# Patient Record
Sex: FEMALE | Race: WHITE | NOT HISPANIC OR LATINO | Employment: OTHER | URBAN - METROPOLITAN AREA
[De-identification: names, ages, dates, MRNs, and addresses within clinical notes are randomized per-mention and may not be internally consistent; named-entity substitution may affect disease eponyms.]

---

## 2017-01-10 ENCOUNTER — PROCEDURE VISIT (OUTPATIENT)
Dept: OBSTETRICS AND GYNECOLOGY | Facility: CLINIC | Age: 62
End: 2017-01-10

## 2017-01-10 DIAGNOSIS — Z78.0 POSTMENOPAUSAL: ICD-10-CM

## 2017-01-10 DIAGNOSIS — Z13.820 SCREENING FOR OSTEOPOROSIS: Primary | ICD-10-CM

## 2017-01-10 DIAGNOSIS — M85.80 OSTEOPENIA: ICD-10-CM

## 2017-01-10 PROCEDURE — 77080 DXA BONE DENSITY AXIAL: CPT | Performed by: NURSE PRACTITIONER

## 2017-01-10 NOTE — MR AVS SNAPSHOT
Pebbles Khan   1/10/2017 8:30 AM   Appointment    Dept Phone:  [phone]   Encounter #:  [id number]    Provider:  YORDY ORTEGA   Department:  [facility]                Your Full Care Plan              Your Updated Medication List      Notice  As of 1/10/2017  8:33 AM    You have not been prescribed any medications.            Instructions     None    Patient Instructions History      Upcoming Appointments     Visit Type Date Time Department    BONE DENSITY 1/10/2017  8:30 AM [facility] [place]    WELLNESS 2017  9:00 AM [facility] [place]      [employer] Signup     [employer] allows you to send messages to your doctor, view your test results, renew your prescriptions, schedule appointments, and more. To sign up, go to [employer] and click on the Sign Up Now link in the New User? box. Enter your World[employer] Activation Code exactly as it appears below along with the last four digits of your Social Security Number and your Date of Birth () to complete the sign-up process. If you do not sign up before the expiration date, you must request a new code.    WorldTV Activation Code: [email or web address]  Expires: 2017  8:33 AM    If you have questions, you can email [email or web address] or call [phone] to talk to our WorldTV staff. Remember, [employer] is NOT to be used for urgent needs. For medical emergencies, dial 911.               Other Info from Your Visit           Your Appointments     2017  9:00 AM EST   Wellness with [facility]   [facility] (--)    [address] [place] [address]   [phone]              Allergies     Not on File

## 2017-01-19 ENCOUNTER — OFFICE VISIT (OUTPATIENT)
Dept: OBSTETRICS AND GYNECOLOGY | Facility: CLINIC | Age: 62
End: 2017-01-19

## 2017-01-19 VITALS
WEIGHT: 153.6 LBS | SYSTOLIC BLOOD PRESSURE: 124 MMHG | BODY MASS INDEX: 28.26 KG/M2 | DIASTOLIC BLOOD PRESSURE: 68 MMHG | HEIGHT: 62 IN

## 2017-01-19 DIAGNOSIS — Z01.419 GYNECOLOGIC EXAM NORMAL: Primary | ICD-10-CM

## 2017-01-19 DIAGNOSIS — M81.0 OSTEOPOROSIS: ICD-10-CM

## 2017-01-19 PROCEDURE — 99396 PREV VISIT EST AGE 40-64: CPT | Performed by: NURSE PRACTITIONER

## 2017-01-19 RX ORDER — SUMATRIPTAN 100 MG/1
TABLET, FILM COATED ORAL
COMMUNITY
Start: 2013-04-26 | End: 2017-12-04 | Stop reason: SDUPTHER

## 2017-01-19 RX ORDER — SIMVASTATIN 40 MG
TABLET ORAL
COMMUNITY
Start: 2013-04-26 | End: 2017-09-06 | Stop reason: SDUPTHER

## 2017-01-19 RX ORDER — LANSOPRAZOLE 15 MG/1
15 CAPSULE, DELAYED RELEASE ORAL DAILY
COMMUNITY
End: 2017-04-17

## 2017-01-19 RX ORDER — UBIDECARENONE 30 MG
CAPSULE ORAL
COMMUNITY
Start: 2013-05-01 | End: 2018-06-04

## 2017-01-19 NOTE — MR AVS SNAPSHOT
Pebbles Khan   2017 1:00 PM   Office Visit    Dept Phone:  [phone]   Encounter #:  [id number]    Provider:  ROBERT Plaza   Department:  [facility]                Your Full Care Plan              Your Updated Medication List          This list is accurate as of: 17  1:56 PM.  Always use your most recent med list.                ADVANCED CALCIUM/D/MAGNESIUM tablet       Fish Oil-Vitamin D 6305-1914 MG-UNIT capsule       lansoprazole 15 MG capsule   Commonly known as:  PREVACID       simvastatin 40 MG tablet   Commonly known as:  ZOCOR       SUMAtriptan 100 MG tablet   Commonly known as:  IMITREX               We Performed the Following     Comprehensive Metabolic Panel     Pap IG, Rfx HPV ASCU     Vitamin D 25 Hydroxy       You Were Diagnosed With        Codes Comments    Gynecologic exam normal    -  Primary ICD-10-CM: Z01.419  ICD-9-CM: V72.31     Osteoporosis     ICD-10-CM: M81.0  ICD-9-CM: 733.00       Medications to be Given to You by a Medical Professional     Due       Frequency    2017 denosumab (PROLIA) syringe 60 mg  Once      Instructions    Reviewed dxa with pt and developed a POC. Gave her brochure on prolia. Discussed risks and benefits     Patient Instructions History      Upcoming Appointments     Visit Type Date Time Department    WELLNESS 2017  1:00 PM [facility]      Startups Signup     [facility] Startups allows you to send messages to your doctor, view your test results, renew your prescriptions, schedule appointments, and more. To sign up, go to [employer] and click on the Sign Up Now link in the New User? box. Enter your Startups Activation Code exactly as it appears below along with the last four digits of your Social Security Number and your Date of Birth () to complete the sign-up process. If you do not sign up before the expiration date, you must request a new code.    Startups  "Activation Code: [email or web address]  Expires: 1/24/2017  8:33 AM    If you have questions, you can email [email or web address] or call [phone] to talk to our MyChart staff. Remember, [employer] is NOT to be used for urgent needs. For medical emergencies, dial 911.               Other Info from Your Visit           Allergies     No Known Allergies      Reason for Visit     Gynecologic Exam annual      Vital Signs     Blood Pressure Height Weight Breastfeeding? Body Mass Index Smoking Status    124/68 62\" (157.5 cm) 153 lb 9.6 oz (69.7 kg) No 28.09 kg/m2 Never Smoker      Problems and Diagnoses Noted     Normal pelvic exam    -  Primary    Osteoporosis            "

## 2017-01-19 NOTE — PROGRESS NOTES
Page B Bill is a 61 y.o. female.   Chief Complaint   Patient presents with   • Gynecologic Exam     annual      HPI:pt here fo annual exam, no c/o since last visit    The following portions of the patient's history were reviewed and updated as appropriate: allergies, current medications, past family history, past medical history, past social history, past surgical history and problem list.    Review of Systems  Review of Systems   Constitutional: Negative.  Negative for unexpected weight change.   Respiratory: Negative for chest tightness and shortness of breath.    Cardiovascular: Negative for chest pain and palpitations.   Gastrointestinal: Negative for abdominal pain and blood in stool.   Endocrine: Negative.    Genitourinary: Negative for dyspareunia, dysuria, frequency, hematuria, menstrual problem, pelvic pain, vaginal bleeding, vaginal discharge and vaginal pain.   Musculoskeletal: Negative for joint swelling.   Skin: Negative for color change, rash and wound.   Allergic/Immunologic: Negative.    Psychiatric/Behavioral: Negative.    All other systems reviewed and are negative.      Objective   Physical Exam   Constitutional: She is oriented to person, place, and time. She appears well-developed and well-nourished.   HENT:   Head: Normocephalic.   Neck: Normal range of motion.   Cardiovascular: Normal rate and regular rhythm.    Pulmonary/Chest: Effort normal and breath sounds normal. Right breast exhibits no mass and no nipple discharge. Left breast exhibits no mass and no nipple discharge. There is no breast swelling.   Breasts soft without palpable masses   Abdominal: Soft. Bowel sounds are normal.   Genitourinary: Vagina normal and uterus normal. There is no rash or lesion on the right labia. There is no rash or lesion on the left labia. Cervix exhibits no friability. Right adnexum displays no mass, no tenderness and no fullness. Left adnexum displays no mass, no tenderness and no fullness.    Genitourinary Comments: Ovaries  Within normal limits. Cervix  Within normal limits   Neurological: She is alert and oriented to person, place, and time.   Skin: Skin is warm and dry.   Psychiatric: She has a normal mood and affect. Her behavior is normal.   Vitals reviewed.      Assessment/Plan   Patient Instructions   Reviewed dxa with pt and developed a POC. Gave her brochure on prolia. Discussed risks and benefits      Page was seen today for gynecologic exam.    Diagnoses and all orders for this visit:    Gynecologic exam normal  -     Pap IG, Rfx HPV ASCU    Osteoporosis  -     Vitamin D 25 Hydroxy  -     Comprehensive Metabolic Panel  -     denosumab (PROLIA) syringe 60 mg; Inject 1 mL under the skin 1 (One) Time.        Return in about 4 weeks (around 2/16/2017).

## 2017-01-19 NOTE — PATIENT INSTRUCTIONS
Reviewed dxa with pt and developed a POC. Gave her brochure on prolia. Discussed risks and benefits

## 2017-01-20 LAB
25(OH)D3+25(OH)D2 SERPL-MCNC: 49 NG/ML (ref 30–100)
ALBUMIN SERPL-MCNC: 4.8 G/DL (ref 3.5–5.2)
ALBUMIN/GLOB SERPL: 2.1 G/DL
ALP SERPL-CCNC: 62 U/L (ref 39–117)
ALT SERPL-CCNC: 23 U/L (ref 1–33)
AST SERPL-CCNC: 24 U/L (ref 1–32)
BILIRUB SERPL-MCNC: 0.4 MG/DL (ref 0.1–1.2)
BUN SERPL-MCNC: 15 MG/DL (ref 8–23)
BUN/CREAT SERPL: 11.5 (ref 7–25)
CALCIUM SERPL-MCNC: 9.7 MG/DL (ref 8.6–10.5)
CHLORIDE SERPL-SCNC: 99 MMOL/L (ref 98–107)
CO2 SERPL-SCNC: 28.8 MMOL/L (ref 22–29)
CREAT SERPL-MCNC: 1.31 MG/DL (ref 0.57–1)
GLOBULIN SER CALC-MCNC: 2.3 GM/DL
GLUCOSE SERPL-MCNC: 95 MG/DL (ref 65–99)
POTASSIUM SERPL-SCNC: 4.6 MMOL/L (ref 3.5–5.2)
PROT SERPL-MCNC: 7.1 G/DL (ref 6–8.5)
SODIUM SERPL-SCNC: 140 MMOL/L (ref 136–145)

## 2017-01-23 LAB
CONV .: NORMAL
CYTOLOGIST CVX/VAG CYTO: NORMAL
CYTOLOGY CVX/VAG DOC THIN PREP: NORMAL
DX ICD CODE: NORMAL
HIV 1 & 2 AB SER-IMP: NORMAL
OTHER STN SPEC: NORMAL
PATH REPORT.FINAL DX SPEC: NORMAL
STAT OF ADQ CVX/VAG CYTO-IMP: NORMAL

## 2017-04-17 ENCOUNTER — OFFICE VISIT (OUTPATIENT)
Dept: INTERNAL MEDICINE | Facility: CLINIC | Age: 62
End: 2017-04-17

## 2017-04-17 VITALS
TEMPERATURE: 98.5 F | BODY MASS INDEX: 25.69 KG/M2 | HEART RATE: 80 BPM | SYSTOLIC BLOOD PRESSURE: 128 MMHG | WEIGHT: 145 LBS | HEIGHT: 63 IN | DIASTOLIC BLOOD PRESSURE: 82 MMHG | RESPIRATION RATE: 16 BRPM

## 2017-04-17 DIAGNOSIS — E78.2 MIXED HYPERLIPIDEMIA: Primary | ICD-10-CM

## 2017-04-17 DIAGNOSIS — K44.9 HIATAL HERNIA: ICD-10-CM

## 2017-04-17 DIAGNOSIS — G43.001 MIGRAINE WITHOUT AURA AND WITH STATUS MIGRAINOSUS, NOT INTRACTABLE: ICD-10-CM

## 2017-04-17 DIAGNOSIS — M85.80 OSTEOPENIA: ICD-10-CM

## 2017-04-17 PROBLEM — E78.5 HYPERLIPIDEMIA: Status: ACTIVE | Noted: 2017-04-17

## 2017-04-17 PROBLEM — G43.909 MIGRAINE: Status: ACTIVE | Noted: 2017-04-17

## 2017-04-17 PROBLEM — K21.00 GASTROESOPHAGEAL REFLUX DISEASE WITH ESOPHAGITIS: Status: ACTIVE | Noted: 2017-04-17

## 2017-04-17 PROCEDURE — 99214 OFFICE O/P EST MOD 30 MIN: CPT | Performed by: FAMILY MEDICINE

## 2017-04-17 RX ORDER — CHOLECALCIFEROL (VITAMIN D3) 125 MCG
5 CAPSULE ORAL
COMMUNITY

## 2017-04-17 RX ORDER — CHOLECALCIFEROL (VITAMIN D3) 125 MCG
CAPSULE ORAL
COMMUNITY
End: 2018-06-04

## 2017-04-17 RX ORDER — PHENOL 1.4 %
1200 AEROSOL, SPRAY (ML) MUCOUS MEMBRANE DAILY
COMMUNITY
End: 2018-06-04

## 2017-04-17 NOTE — PROGRESS NOTES
Subjective   Page ABRAM Khan is a 61 y.o. female.     Chief Complaint   Patient presents with   • Hypertension   • Hyperlipidemia   • osteopenia         History of Present Illness page is here to resume primary care.  Her previous physician is a different style practice.  Prior labs reviewed.  History includes hyperlipidemia and she is gotten off medication for reflux due to history of osteopenia with T score -2.3.  She does not wish to take medical treatment for osteopenia because of possible side effects.  She is on calcium and vitamin D and exercise.  He has a follow-up with her OB/GYN as well.  DEXA scan is reviewed as well as labs.    The following portions of the patient's history were reviewed and updated as appropriate: allergies, current medications, past social history and problem list.    Review of Systems   Constitutional: Negative.    HENT: Negative.    Eyes: Negative.    Respiratory: Negative.    Cardiovascular: Negative.    Gastrointestinal: Negative.    Endocrine: Negative.    Genitourinary: Negative.    Musculoskeletal: Negative.    Skin: Negative.    Allergic/Immunologic: Negative.    Neurological: Negative.    Hematological: Negative.    Psychiatric/Behavioral: Negative.        Objective   Vitals:    04/17/17 1248   BP: 128/82   Pulse: 80   Resp: 16   Temp: 98.5 °F (36.9 °C)     Physical Exam   Constitutional: She is oriented to person, place, and time. She appears well-developed and well-nourished.   HENT:   Head: Normocephalic and atraumatic.   Right Ear: Tympanic membrane and external ear normal.   Left Ear: Tympanic membrane and external ear normal.   Nose: Nose normal.   Mouth/Throat: Oropharynx is clear and moist.   Eyes: Conjunctivae and EOM are normal. Pupils are equal, round, and reactive to light.   Neck: Normal range of motion. Neck supple. No JVD present. No thyromegaly present.   Cardiovascular: Normal rate, regular rhythm, normal heart sounds and intact distal pulses.     Pulmonary/Chest: Effort normal and breath sounds normal.   Abdominal: Soft. Bowel sounds are normal.   Musculoskeletal: Normal range of motion.   Lymphadenopathy:     She has no cervical adenopathy.   Neurological: She is alert and oriented to person, place, and time. No cranial nerve deficit. Coordination normal.   Skin: Skin is warm and dry. No rash noted.   Psychiatric: She has a normal mood and affect. Her behavior is normal. Judgment and thought content normal.   Vitals reviewed.      Assessment/Plan   Problem List Items Addressed This Visit        Cardiovascular and Mediastinum    Hyperlipidemia - Primary    Migraine       Respiratory    Hiatal hernia      Other Visit Diagnoses     Osteopenia          Plan: Continue simvastatin 40 mg daily.  Over-the-counter hazel when necessary.  Vitamin D plus calcium.  Exercise.  We'll see her back here sooner if needed.  She'll repeat DEXA scan through GYN and will review that when she comes back in.

## 2017-08-17 ENCOUNTER — APPOINTMENT (OUTPATIENT)
Dept: WOMENS IMAGING | Facility: HOSPITAL | Age: 62
End: 2017-08-17

## 2017-08-17 PROCEDURE — 77067 SCR MAMMO BI INCL CAD: CPT | Performed by: RADIOLOGY

## 2017-08-17 PROCEDURE — G0202 SCR MAMMO BI INCL CAD: HCPCS | Performed by: RADIOLOGY

## 2017-08-17 PROCEDURE — 77063 BREAST TOMOSYNTHESIS BI: CPT | Performed by: RADIOLOGY

## 2017-09-06 RX ORDER — SIMVASTATIN 40 MG
TABLET ORAL
Qty: 30 TABLET | Refills: 0 | Status: SHIPPED | OUTPATIENT
Start: 2017-09-06 | End: 2017-10-05 | Stop reason: SDUPTHER

## 2017-10-05 RX ORDER — SIMVASTATIN 40 MG
TABLET ORAL
Qty: 30 TABLET | Refills: 0 | Status: SHIPPED | OUTPATIENT
Start: 2017-10-05 | End: 2017-10-30 | Stop reason: SDUPTHER

## 2017-10-30 RX ORDER — SIMVASTATIN 40 MG
TABLET ORAL
Qty: 90 TABLET | Refills: 1 | Status: SHIPPED | OUTPATIENT
Start: 2017-10-30 | End: 2018-04-19 | Stop reason: SDUPTHER

## 2017-12-04 RX ORDER — SUMATRIPTAN 100 MG/1
TABLET, FILM COATED ORAL
Qty: 9 TABLET | Refills: 5 | Status: SHIPPED | OUTPATIENT
Start: 2017-12-04 | End: 2018-10-22 | Stop reason: SDUPTHER

## 2018-04-19 RX ORDER — SIMVASTATIN 40 MG
TABLET ORAL
Qty: 90 TABLET | Refills: 0 | Status: SHIPPED | OUTPATIENT
Start: 2018-04-19 | End: 2018-07-15 | Stop reason: SDUPTHER

## 2018-06-04 ENCOUNTER — OFFICE VISIT (OUTPATIENT)
Dept: INTERNAL MEDICINE | Facility: CLINIC | Age: 63
End: 2018-06-04

## 2018-06-04 VITALS
WEIGHT: 154 LBS | OXYGEN SATURATION: 98 % | SYSTOLIC BLOOD PRESSURE: 118 MMHG | HEART RATE: 109 BPM | BODY MASS INDEX: 27.72 KG/M2 | DIASTOLIC BLOOD PRESSURE: 88 MMHG | TEMPERATURE: 98.5 F

## 2018-06-04 DIAGNOSIS — G89.29 CHRONIC MIDLINE LOW BACK PAIN WITHOUT SCIATICA: ICD-10-CM

## 2018-06-04 DIAGNOSIS — K44.9 HIATAL HERNIA: Primary | ICD-10-CM

## 2018-06-04 DIAGNOSIS — E78.2 MIXED HYPERLIPIDEMIA: ICD-10-CM

## 2018-06-04 DIAGNOSIS — K21.00 GASTROESOPHAGEAL REFLUX DISEASE WITH ESOPHAGITIS: ICD-10-CM

## 2018-06-04 DIAGNOSIS — G43.001 MIGRAINE WITHOUT AURA AND WITH STATUS MIGRAINOSUS, NOT INTRACTABLE: ICD-10-CM

## 2018-06-04 DIAGNOSIS — M54.50 CHRONIC MIDLINE LOW BACK PAIN WITHOUT SCIATICA: ICD-10-CM

## 2018-06-04 PROCEDURE — 99396 PREV VISIT EST AGE 40-64: CPT | Performed by: FAMILY MEDICINE

## 2018-06-04 PROCEDURE — 99213 OFFICE O/P EST LOW 20 MIN: CPT | Performed by: FAMILY MEDICINE

## 2018-06-04 RX ORDER — LANSOPRAZOLE 15 MG/1
15 CAPSULE, DELAYED RELEASE ORAL DAILY
COMMUNITY

## 2018-06-04 NOTE — PROGRESS NOTES
Subjective   Page B Bill is a 62 y.o. female.     Chief Complaint   Patient presents with   • Annual Exam         History of Present Illness   Delightful lady here for annual exam.  History includes green headache, fairly large hiatal hernia followed by gastroenterology, pending colonoscopy and upper endoscopy.  Also history of mixed hyperlipidemia.  Last year there was elevation of creatinine slightly 1.31 with the worry about proton pump inhibitor chronically.  We'll repeat labs this week.      The following portions of the patient's history were reviewed and updated as appropriate: allergies, current medications, past social history and problem list.    Review of Systems   Constitutional: Negative.    HENT: Negative.    Eyes: Negative.    Respiratory: Negative.    Cardiovascular: Negative.    Gastrointestinal: Negative.    Endocrine: Negative.    Genitourinary: Negative.    Musculoskeletal: Positive for back pain.   Allergic/Immunologic: Negative.    Neurological: Negative.    Hematological: Negative.    Psychiatric/Behavioral: Negative.        Objective   Vitals:    06/04/18 1550   BP: 118/88   Pulse: 109   Temp: 98.5 °F (36.9 °C)   SpO2: 98%     Physical Exam   Constitutional: She is oriented to person, place, and time. She appears well-developed.   HENT:   Head: Normocephalic.   Right Ear: Tympanic membrane and external ear normal.   Left Ear: Tympanic membrane and external ear normal.   Mouth/Throat: Oropharynx is clear and moist.   Eyes: Pupils are equal, round, and reactive to light.   Neck: Normal range of motion. Neck supple.   Cardiovascular: Normal rate, regular rhythm and normal heart sounds.    Pulmonary/Chest: Effort normal and breath sounds normal.   Abdominal: Soft. Bowel sounds are normal.   Musculoskeletal: Normal range of motion.   Neurological: She is alert and oriented to person, place, and time.   Skin: Skin is warm and dry.   Psychiatric: She has a normal mood and affect.   Vitals  reviewed.      Assessment/Plan   Problem List Items Addressed This Visit        Cardiovascular and Mediastinum    Hyperlipidemia    Relevant Orders    CBC & Differential    Comprehensive Metabolic Panel    Lipid Panel With / Chol / HDL Ratio    TSH    Urinalysis With / Microscopic If Indicated (No Culture) - Urine, Clean Catch    T4, Free    Migraine    Relevant Orders    CBC & Differential    Comprehensive Metabolic Panel    Lipid Panel With / Chol / HDL Ratio    TSH    Urinalysis With / Microscopic If Indicated (No Culture) - Urine, Clean Catch    T4, Free       Respiratory    Hiatal hernia - Primary    Relevant Medications    lansoprazole (PREVACID) 15 MG capsule    Other Relevant Orders    CBC & Differential    Comprehensive Metabolic Panel    Lipid Panel With / Chol / HDL Ratio    TSH    Urinalysis With / Microscopic If Indicated (No Culture) - Urine, Clean Catch    T4, Free       Digestive    Gastroesophageal reflux disease with esophagitis    Relevant Medications    lansoprazole (PREVACID) 15 MG capsule    Other Relevant Orders    CBC & Differential    Comprehensive Metabolic Panel    Lipid Panel With / Chol / HDL Ratio    TSH    Urinalysis With / Microscopic If Indicated (No Culture) - Urine, Clean Catch    T4, Free       Nervous and Auditory    Chronic midline low back pain without sciatica      Plan: Screening labs meds remain the same take a look at creatinine consider nephrology still elevated.Exercises for low back her described to her.

## 2018-06-05 LAB
ALBUMIN SERPL-MCNC: 5 G/DL (ref 3.5–5.2)
ALBUMIN/GLOB SERPL: 1.7 G/DL
ALP SERPL-CCNC: 73 U/L (ref 39–117)
ALT SERPL-CCNC: 17 U/L (ref 1–33)
APPEARANCE UR: ABNORMAL
AST SERPL-CCNC: 15 U/L (ref 1–32)
BACTERIA #/AREA URNS HPF: NORMAL /HPF
BASOPHILS # BLD AUTO: 0.02 10*3/MM3 (ref 0–0.2)
BASOPHILS NFR BLD AUTO: 0.2 % (ref 0–1.5)
BILIRUB SERPL-MCNC: 0.3 MG/DL (ref 0.1–1.2)
BILIRUB UR QL STRIP: NEGATIVE
BUN SERPL-MCNC: 20 MG/DL (ref 8–23)
BUN/CREAT SERPL: 24.7 (ref 7–25)
CALCIUM SERPL-MCNC: 9.9 MG/DL (ref 8.6–10.5)
CASTS URNS MICRO: NORMAL
CHLORIDE SERPL-SCNC: 100 MMOL/L (ref 98–107)
CHOLEST SERPL-MCNC: 196 MG/DL (ref 0–200)
CHOLEST/HDLC SERPL: 3.02 {RATIO}
CO2 SERPL-SCNC: 24.8 MMOL/L (ref 22–29)
COLOR UR: YELLOW
CREAT SERPL-MCNC: 0.81 MG/DL (ref 0.57–1)
EOSINOPHIL # BLD AUTO: 0.06 10*3/MM3 (ref 0–0.7)
EOSINOPHIL NFR BLD AUTO: 0.7 % (ref 0.3–6.2)
EPI CELLS #/AREA URNS HPF: NORMAL /HPF
ERYTHROCYTE [DISTWIDTH] IN BLOOD BY AUTOMATED COUNT: 13.8 % (ref 11.7–13)
GFR SERPLBLD CREATININE-BSD FMLA CKD-EPI: 71 ML/MIN/1.73
GFR SERPLBLD CREATININE-BSD FMLA CKD-EPI: 86 ML/MIN/1.73
GLOBULIN SER CALC-MCNC: 2.9 GM/DL
GLUCOSE SERPL-MCNC: 85 MG/DL (ref 65–99)
GLUCOSE UR QL: NEGATIVE
HCT VFR BLD AUTO: 46 % (ref 35.6–45.5)
HDLC SERPL-MCNC: 65 MG/DL (ref 40–60)
HGB BLD-MCNC: 14.3 G/DL (ref 11.9–15.5)
HGB UR QL STRIP: NEGATIVE
IMM GRANULOCYTES # BLD: 0.02 10*3/MM3 (ref 0–0.03)
IMM GRANULOCYTES NFR BLD: 0.2 % (ref 0–0.5)
KETONES UR QL STRIP: NEGATIVE
LDLC SERPL CALC-MCNC: 90 MG/DL (ref 0–100)
LEUKOCYTE ESTERASE UR QL STRIP: ABNORMAL
LYMPHOCYTES # BLD AUTO: 1.77 10*3/MM3 (ref 0.9–4.8)
LYMPHOCYTES NFR BLD AUTO: 19.9 % (ref 19.6–45.3)
MCH RBC QN AUTO: 28.8 PG (ref 26.9–32)
MCHC RBC AUTO-ENTMCNC: 31.1 G/DL (ref 32.4–36.3)
MCV RBC AUTO: 92.7 FL (ref 80.5–98.2)
MONOCYTES # BLD AUTO: 0.61 10*3/MM3 (ref 0.2–1.2)
MONOCYTES NFR BLD AUTO: 6.9 % (ref 5–12)
NEUTROPHILS # BLD AUTO: 6.41 10*3/MM3 (ref 1.9–8.1)
NEUTROPHILS NFR BLD AUTO: 72.1 % (ref 42.7–76)
NITRITE UR QL STRIP: NEGATIVE
PH UR STRIP: 6.5 [PH] (ref 5–8)
PLATELET # BLD AUTO: 302 10*3/MM3 (ref 140–500)
POTASSIUM SERPL-SCNC: 4.2 MMOL/L (ref 3.5–5.2)
PROT SERPL-MCNC: 7.9 G/DL (ref 6–8.5)
PROT UR QL STRIP: NEGATIVE
RBC # BLD AUTO: 4.96 10*6/MM3 (ref 3.9–5.2)
RBC #/AREA URNS HPF: NORMAL /HPF
SODIUM SERPL-SCNC: 143 MMOL/L (ref 136–145)
SP GR UR: 1.01 (ref 1–1.03)
T4 FREE SERPL-MCNC: 1.26 NG/DL (ref 0.93–1.7)
TRIGL SERPL-MCNC: 205 MG/DL (ref 0–150)
TSH SERPL DL<=0.005 MIU/L-ACNC: 1.68 MIU/ML (ref 0.27–4.2)
UROBILINOGEN UR STRIP-MCNC: ABNORMAL MG/DL
VLDLC SERPL CALC-MCNC: 41 MG/DL (ref 5–40)
WBC # BLD AUTO: 8.89 10*3/MM3 (ref 4.5–10.7)
WBC #/AREA URNS HPF: NORMAL /HPF

## 2018-07-16 RX ORDER — SIMVASTATIN 40 MG
TABLET ORAL
Qty: 90 TABLET | Refills: 0 | Status: SHIPPED | OUTPATIENT
Start: 2018-07-16 | End: 2018-10-09 | Stop reason: SDUPTHER

## 2018-08-20 ENCOUNTER — APPOINTMENT (OUTPATIENT)
Dept: WOMENS IMAGING | Facility: HOSPITAL | Age: 63
End: 2018-08-20

## 2018-08-20 PROCEDURE — 77063 BREAST TOMOSYNTHESIS BI: CPT | Performed by: RADIOLOGY

## 2018-08-20 PROCEDURE — 77067 SCR MAMMO BI INCL CAD: CPT | Performed by: RADIOLOGY

## 2018-10-09 RX ORDER — SIMVASTATIN 40 MG
TABLET ORAL
Qty: 90 TABLET | Refills: 1 | Status: SHIPPED | OUTPATIENT
Start: 2018-10-09 | End: 2019-04-19 | Stop reason: SDUPTHER

## 2018-10-22 RX ORDER — SUMATRIPTAN 100 MG/1
TABLET, FILM COATED ORAL
Qty: 9 TABLET | Refills: 5 | Status: SHIPPED | OUTPATIENT
Start: 2018-10-22 | End: 2019-06-04 | Stop reason: SDUPTHER

## 2019-04-19 RX ORDER — SIMVASTATIN 40 MG
TABLET ORAL
Qty: 90 TABLET | Refills: 1 | Status: SHIPPED | OUTPATIENT
Start: 2019-04-19 | End: 2019-08-14 | Stop reason: SDUPTHER

## 2019-06-04 RX ORDER — SUMATRIPTAN 100 MG/1
TABLET, FILM COATED ORAL
Qty: 9 TABLET | Refills: 0 | Status: SHIPPED | OUTPATIENT
Start: 2019-06-04 | End: 2019-07-05 | Stop reason: SDUPTHER

## 2019-06-05 ENCOUNTER — OFFICE VISIT (OUTPATIENT)
Dept: INTERNAL MEDICINE | Facility: CLINIC | Age: 64
End: 2019-06-05

## 2019-06-05 VITALS
BODY MASS INDEX: 27.54 KG/M2 | OXYGEN SATURATION: 97 % | WEIGHT: 153 LBS | HEART RATE: 97 BPM | DIASTOLIC BLOOD PRESSURE: 74 MMHG | SYSTOLIC BLOOD PRESSURE: 116 MMHG | TEMPERATURE: 98.3 F

## 2019-06-05 DIAGNOSIS — Z90.49 S/P RIGHT COLECTOMY: ICD-10-CM

## 2019-06-05 DIAGNOSIS — E55.9 VITAMIN D DEFICIENCY: ICD-10-CM

## 2019-06-05 DIAGNOSIS — Z13.820 OSTEOPOROSIS SCREENING: ICD-10-CM

## 2019-06-05 DIAGNOSIS — E78.2 MIXED HYPERLIPIDEMIA: Primary | ICD-10-CM

## 2019-06-05 DIAGNOSIS — K21.00 GASTROESOPHAGEAL REFLUX DISEASE WITH ESOPHAGITIS: ICD-10-CM

## 2019-06-05 PROCEDURE — 99397 PER PM REEVAL EST PAT 65+ YR: CPT | Performed by: FAMILY MEDICINE

## 2019-06-05 NOTE — PROGRESS NOTES
Subjective   Page B Bill is a 63 y.o. female.     Chief Complaint   Patient presents with   • Annual Exam   • Hyperlipidemia   • GI Problem         History of Present Illness   Delightful lady here for annual follow-up.  [facility]    Within the past year she has had right hemicolectomy for adenomatous polyp which could not be resected.  This went well.  She is treated for hyperlipidemia.  Labs will be pending today.    Refills are reviewed.      The following portions of the patient's history were reviewed and updated as appropriate: allergies, current medications, past social history and problem list.    Review of Systems   Constitutional: Negative.    HENT: Negative.    Eyes: Negative.    Respiratory: Negative.    Cardiovascular: Negative.    Gastrointestinal: Negative.    Endocrine: Negative.    Genitourinary: Negative.    Musculoskeletal: Negative.    Skin: Negative.    Allergic/Immunologic: Negative.    Neurological: Negative.    Hematological: Negative.    Psychiatric/Behavioral: Negative.        Objective   Vitals:    06/05/19 1142   BP: 116/74   Pulse: 97   Temp: 98.3 °F (36.8 °C)   SpO2: 97%     Physical Exam   Constitutional: She is oriented to person, place, and time. She appears well-developed and well-nourished.   HENT:   Head: Normocephalic and atraumatic.   Right Ear: Tympanic membrane and external ear normal.   Left Ear: Tympanic membrane and external ear normal.   Nose: Nose normal.   Mouth/Throat: Oropharynx is clear and moist.   Eyes: Conjunctivae and EOM are normal. Pupils are equal, round, and reactive to light.   Neck: Normal range of motion. Neck supple. No JVD present. No thyromegaly present.   Cardiovascular: Normal rate, regular rhythm and intact distal pulses.  Occasional extrasystoles are present.   Murmur heard.   Systolic murmur is present with a grade of 2/6.  Pulmonary/Chest: Effort normal and breath sounds normal.    Abdominal: Soft. Bowel sounds are normal.   Musculoskeletal: Normal range of motion.   Lymphadenopathy:     She has no cervical adenopathy.   Neurological: She is alert and oriented to person, place, and time. No cranial nerve deficit. Coordination normal.   Skin: Skin is warm and dry. No rash noted.   Psychiatric: She has a normal mood and affect. Her behavior is normal. Judgment and thought content normal.   Vitals reviewed.      Assessment/Plan   Problem List Items Addressed This Visit        Cardiovascular and Mediastinum    Hyperlipidemia - Primary    Relevant Orders    CBC & Differential    Comprehensive Metabolic Panel    Lipid Panel With / Chol / HDL Ratio    TSH    T4, Free    Urinalysis With Microscopic If Indicated (No Culture) - Urine, Clean Catch    Vitamin D 25 Hydroxy       Digestive    Gastroesophageal reflux disease with esophagitis    Relevant Orders    CBC & Differential    Comprehensive Metabolic Panel    Lipid Panel With / Chol / HDL Ratio    TSH    T4, Free    Urinalysis With Microscopic If Indicated (No Culture) - Urine, Clean Catch    Vitamin D 25 Hydroxy      Other Visit Diagnoses     S/P right colectomy        Relevant Orders    CBC & Differential    Comprehensive Metabolic Panel    Lipid Panel With / Chol / HDL Ratio    TSH    T4, Free    Urinalysis With Microscopic If Indicated (No Culture) - Urine, Clean Catch    Vitamin D 25 Hydroxy    Osteoporosis screening        Relevant Orders    DEXA Bone Density Axial    Vitamin D deficiency        Relevant Orders    Vitamin D 25 Hydroxy      Pending labs DEXA scan at [facility] [place] recheck in a year refills reviewed.  She has a systolic murmur but this is not a new finding.

## 2019-06-06 LAB
25(OH)D3+25(OH)D2 SERPL-MCNC: 29.9 NG/ML (ref 30–100)
ALBUMIN SERPL-MCNC: 4.6 G/DL (ref 3.5–5.2)
ALBUMIN/GLOB SERPL: 1.6 G/DL
ALP SERPL-CCNC: 79 U/L (ref 39–117)
ALT SERPL-CCNC: 18 U/L (ref 1–33)
APPEARANCE UR: CLEAR
AST SERPL-CCNC: 17 U/L (ref 1–32)
BASOPHILS # BLD AUTO: 0.04 10*3/MM3 (ref 0–0.2)
BASOPHILS NFR BLD AUTO: 0.5 % (ref 0–1.5)
BILIRUB SERPL-MCNC: 0.3 MG/DL (ref 0.2–1.2)
BILIRUB UR QL STRIP: NEGATIVE
BUN SERPL-MCNC: 13 MG/DL (ref 8–23)
BUN/CREAT SERPL: 14.8 (ref 7–25)
CALCIUM SERPL-MCNC: 10 MG/DL (ref 8.6–10.5)
CHLORIDE SERPL-SCNC: 104 MMOL/L (ref 98–107)
CHOLEST SERPL-MCNC: 165 MG/DL (ref 0–200)
CHOLEST/HDLC SERPL: 2.8 {RATIO}
CO2 SERPL-SCNC: 28.6 MMOL/L (ref 22–29)
COLOR UR: YELLOW
CREAT SERPL-MCNC: 0.88 MG/DL (ref 0.57–1)
EOSINOPHIL # BLD AUTO: 0.07 10*3/MM3 (ref 0–0.4)
EOSINOPHIL NFR BLD AUTO: 0.9 % (ref 0.3–6.2)
ERYTHROCYTE [DISTWIDTH] IN BLOOD BY AUTOMATED COUNT: 14.1 % (ref 12.3–15.4)
GLOBULIN SER CALC-MCNC: 2.8 GM/DL
GLUCOSE SERPL-MCNC: 109 MG/DL (ref 65–99)
GLUCOSE UR QL: NEGATIVE
HCT VFR BLD AUTO: 44.1 % (ref 34–46.6)
HDLC SERPL-MCNC: 59 MG/DL (ref 40–60)
HGB BLD-MCNC: 13.1 G/DL (ref 12–15.9)
HGB UR QL STRIP: NEGATIVE
IMM GRANULOCYTES # BLD AUTO: 0.03 10*3/MM3 (ref 0–0.05)
IMM GRANULOCYTES NFR BLD AUTO: 0.4 % (ref 0–0.5)
KETONES UR QL STRIP: NEGATIVE
LDLC SERPL CALC-MCNC: 64 MG/DL (ref 0–100)
LEUKOCYTE ESTERASE UR QL STRIP: NEGATIVE
LYMPHOCYTES # BLD AUTO: 1.16 10*3/MM3 (ref 0.7–3.1)
LYMPHOCYTES NFR BLD AUTO: 15.7 % (ref 19.6–45.3)
MCH RBC QN AUTO: 28.2 PG (ref 26.6–33)
MCHC RBC AUTO-ENTMCNC: 29.7 G/DL (ref 31.5–35.7)
MCV RBC AUTO: 95 FL (ref 79–97)
MONOCYTES # BLD AUTO: 0.57 10*3/MM3 (ref 0.1–0.9)
MONOCYTES NFR BLD AUTO: 7.7 % (ref 5–12)
NEUTROPHILS # BLD AUTO: 5.51 10*3/MM3 (ref 1.7–7)
NEUTROPHILS NFR BLD AUTO: 74.8 % (ref 42.7–76)
NITRITE UR QL STRIP: NEGATIVE
NRBC BLD AUTO-RTO: 0.1 /100 WBC (ref 0–0.2)
PH UR STRIP: 6 [PH] (ref 5–8)
PLATELET # BLD AUTO: 302 10*3/MM3 (ref 140–450)
POTASSIUM SERPL-SCNC: 4.1 MMOL/L (ref 3.5–5.2)
PROT SERPL-MCNC: 7.4 G/DL (ref 6–8.5)
PROT UR QL STRIP: NEGATIVE
RBC # BLD AUTO: 4.64 10*6/MM3 (ref 3.77–5.28)
SODIUM SERPL-SCNC: 142 MMOL/L (ref 136–145)
SP GR UR: 1.01 (ref 1–1.03)
T4 FREE SERPL-MCNC: 1.2 NG/DL (ref 0.93–1.7)
TRIGL SERPL-MCNC: 209 MG/DL (ref 0–150)
TSH SERPL DL<=0.005 MIU/L-ACNC: 1.2 MIU/ML (ref 0.27–4.2)
UROBILINOGEN UR STRIP-MCNC: NORMAL MG/DL
VLDLC SERPL CALC-MCNC: 41.8 MG/DL
WBC # BLD AUTO: 7.38 10*3/MM3 (ref 3.4–10.8)

## 2019-07-05 RX ORDER — SUMATRIPTAN 100 MG/1
TABLET, FILM COATED ORAL
Qty: 9 TABLET | Refills: 0 | Status: SHIPPED | OUTPATIENT
Start: 2019-07-05 | End: 2019-08-10 | Stop reason: SDUPTHER

## 2019-08-12 RX ORDER — SUMATRIPTAN 100 MG/1
TABLET, FILM COATED ORAL
Qty: 9 TABLET | Refills: 3 | Status: SHIPPED | OUTPATIENT
Start: 2019-08-12 | End: 2019-12-05 | Stop reason: SDUPTHER

## 2019-08-15 RX ORDER — SIMVASTATIN 40 MG
TABLET ORAL
Qty: 90 TABLET | Refills: 2 | Status: SHIPPED | OUTPATIENT
Start: 2019-08-15 | End: 2020-06-23

## 2019-08-16 DIAGNOSIS — M81.0 POSTMENOPAUSAL BONE LOSS: Primary | ICD-10-CM

## 2019-08-21 ENCOUNTER — APPOINTMENT (OUTPATIENT)
Dept: WOMENS IMAGING | Facility: HOSPITAL | Age: 64
End: 2019-08-21

## 2019-08-21 PROCEDURE — 77067 SCR MAMMO BI INCL CAD: CPT | Performed by: RADIOLOGY

## 2019-08-21 PROCEDURE — 77063 BREAST TOMOSYNTHESIS BI: CPT | Performed by: RADIOLOGY

## 2019-08-21 PROCEDURE — 77080 DXA BONE DENSITY AXIAL: CPT | Performed by: RADIOLOGY

## 2019-12-05 RX ORDER — SUMATRIPTAN 100 MG/1
TABLET, FILM COATED ORAL
Qty: 9 TABLET | Refills: 4 | Status: SHIPPED | OUTPATIENT
Start: 2019-12-05 | End: 2020-04-27

## 2020-02-12 ENCOUNTER — PREP FOR SURGERY (OUTPATIENT)
Dept: OTHER | Facility: HOSPITAL | Age: 65
End: 2020-02-12

## 2020-02-12 ENCOUNTER — OFFICE VISIT (OUTPATIENT)
Dept: GASTROENTEROLOGY | Facility: CLINIC | Age: 65
End: 2020-02-12

## 2020-02-12 VITALS
OXYGEN SATURATION: 97 % | TEMPERATURE: 98.8 F | BODY MASS INDEX: 29.63 KG/M2 | HEART RATE: 114 BPM | DIASTOLIC BLOOD PRESSURE: 74 MMHG | HEIGHT: 62 IN | WEIGHT: 161 LBS | SYSTOLIC BLOOD PRESSURE: 130 MMHG

## 2020-02-12 DIAGNOSIS — Z90.49 S/P RIGHT HEMICOLECTOMY: ICD-10-CM

## 2020-02-12 DIAGNOSIS — K21.9 GASTROESOPHAGEAL REFLUX DISEASE, ESOPHAGITIS PRESENCE NOT SPECIFIED: ICD-10-CM

## 2020-02-12 DIAGNOSIS — Z86.010 HISTORY OF COLON POLYPS: Primary | ICD-10-CM

## 2020-02-12 DIAGNOSIS — K57.30 DIVERTICULOSIS OF COLON: ICD-10-CM

## 2020-02-12 DIAGNOSIS — K44.9 HIATAL HERNIA: ICD-10-CM

## 2020-02-12 PROCEDURE — 99214 OFFICE O/P EST MOD 30 MIN: CPT | Performed by: INTERNAL MEDICINE

## 2020-02-12 NOTE — PROGRESS NOTES
Heartburn      HPI  Patient here in the clinic today for a follow-up.  She has a history of a 7 cm hiatal hernia.  She has been having increasing and worsening reflux symptoms.  These occur daily.  She describes burning in the chest.  It is not associated with dysphagia.  No associated weight loss.  The discomfort starts in the epigastrium and rises up into the chest.    Patient is also here today for follow-up from her right hemicolectomy.  This was performed by Dr. Guzman on August 30, 2018.  Pathology was reviewed and all lymph nodes were negative.  She had a large sessile serrated adenoma which could not be removed endoscopically and this necessitated the right hemicolectomy.  Her previous colonoscopy from June 12, 2018 was also reviewed which also showed sigmoid colon and descending colon diverticulosis.  She was recommended by her surgeon to have a follow-up colonoscopy within 1 year and this will be scheduled today.    Review of Systems   Constitutional: Negative for appetite change, chills, diaphoresis, fatigue, fever and unexpected weight change.   HENT: Negative for dental problem, mouth sores, rhinorrhea, sore throat and voice change.    Eyes: Negative for pain, redness and visual disturbance.   Respiratory: Negative for cough, chest tightness and wheezing.    Cardiovascular: Negative for chest pain, palpitations and leg swelling.   Endocrine: Negative for cold intolerance, heat intolerance, polydipsia, polyphagia and polyuria.   Genitourinary: Negative for dysuria, frequency, hematuria and urgency.   Musculoskeletal: Negative for arthralgias, back pain, joint swelling, myalgias and neck pain.   Skin: Negative for rash.   Allergic/Immunologic: Negative for environmental allergies, food allergies and immunocompromised state.   Neurological: Negative for dizziness, seizures, weakness, numbness and headaches.   Hematological: Does not bruise/bleed easily.   Psychiatric/Behavioral: Negative for sleep  disturbance. The patient is not nervous/anxious.         Problem List:    Patient Active Problem List   Diagnosis   • Hyperlipidemia   • Migraine   • Gastroesophageal reflux disease with esophagitis   • Hiatal hernia   • Chronic midline low back pain without sciatica       Medical History:    Past Medical History:   Diagnosis Date   • GERD (gastroesophageal reflux disease)    • Hyperlipidemia    • Migraines         Social History:    Social History     Socioeconomic History   • Marital status:      Spouse name: Not on file   • Number of children: 2   • Years of education: Not on file   • Highest education level: Not on file   Tobacco Use   • Smoking status: Never Smoker   Substance and Sexual Activity   • Alcohol use: No   • Drug use: No   • Sexual activity: Yes     Partners: Male       Family History:   Family History   Problem Relation Age of Onset   • Lung cancer Father        Surgical History:   Past Surgical History:   Procedure Laterality Date   • CHOLECYSTECTOMY     • COLONOSCOPY     • ENDOSCOPY           Current Outpatient Medications:   •  lansoprazole (PREVACID) 15 MG capsule, Take 15 mg by mouth Daily., Disp: , Rfl:   •  Loratadine (ALAVERT PO), Take  by mouth., Disp: , Rfl:   •  melatonin 5 MG tablet tablet, Take 5 mg by mouth., Disp: , Rfl:   •  polyethyl glycol-propyl glycol (SYSTANE) 0.4-0.3 % solution ophthalmic solution, 2 drops Every Morning., Disp: , Rfl:   •  Probiotic Product (ALIGN PO), Take  by mouth., Disp: , Rfl:   •  simvastatin (ZOCOR) 40 MG tablet, TAKE 1 TABLET BY MOUTH EVERY NIGHT AT BEDTIME, Disp: 90 tablet, Rfl: 2  •  SUMAtriptan (IMITREX) 100 MG tablet, TAKE 1 TABLET BY MOUTH TWICE DAILY AS NEEDED FOR HEADACHE, Disp: 9 tablet, Rfl: 4    Allergies:   Allergies   Allergen Reactions   • Ibuprofen Itching        The following portions of the patient's history were reviewed by me and updated as appropriate: review of systems, allergies, current medications, past family history,  past medical history, past social history, past surgical history and problem list.    Vitals:    02/12/20 1314   BP: 130/74   Pulse: 114   Temp: 98.8 °F (37.1 °C)   SpO2: 97%       Physical Exam   Abdominal: Soft. Normal appearance and bowel sounds are normal. She exhibits no distension, no pulsatile midline mass and no mass. There is no tenderness. There is no rigidity, no rebound and no guarding.   Vitals reviewed.       Assessment/ Plan  Diagnoses and all orders for this visit:    History of colon polyps    Gastroesophageal reflux disease, esophagitis presence not specified    Hiatal hernia    S/P right hemicolectomy    Diverticulosis of colon        Return for Return to the clinic with the nurse practitioner in 4 weeks.      Discussion:  We will proceed with an EGD for evaluation of her worsening reflux symptoms in the setting of a known large hiatal hernia and will also proceed with colonoscopy for surveillance due to the advanced adenoma which was removed surgically.  I advised her regarding antireflux measures as well as increasing fiber intake to help with loose stools post hemicolectomy as well as to prevent diverticulitis.    Note: Refer to After Visit Summary for details of patient counseling, education and instructions provided during the encounter

## 2020-02-12 NOTE — PATIENT INSTRUCTIONS
Heartburn  Heartburn is a type of pain or discomfort that can happen in the throat or chest. It is often described as a burning pain. It may also cause a bad, acid-like taste in the mouth. Heartburn may feel worse when you lie down or bend over, and it is often worse at night. Heartburn may be caused by stomach contents that move back up into the esophagus (reflux).  Follow these instructions at home:  Eating and drinking    · Avoid certain foods and drinks as told by your health care provider. This may include:  ? Coffee and tea (with or without caffeine).  ? Drinks that contain alcohol.  ? Energy drinks and sports drinks.  ? Carbonated drinks or sodas.  ? Chocolate and cocoa.  ? Peppermint and mint flavorings.  ? Garlic and onions.  ? Horseradish.  ? Spicy and acidic foods, including peppers, chili powder, brannon powder, vinegar, hot sauces, and barbecue sauce.  ? Citrus fruit juices and citrus fruits, such as oranges, irma, and limes.  ? Tomato-based foods, such as red sauce, chili, salsa, and pizza with red sauce.  ? Fried and fatty foods, such as donuts, french fries, potato chips, and high-fat dressings.  ? High-fat meats, such as hot dogs and fatty cuts of red and white meats, such as rib eye steak, sausage, ham, and yates.  ? High-fat dairy items, such as whole milk, butter, and cream cheese.  · Eat small, frequent meals instead of large meals.  · Avoid drinking large amounts of liquid with your meals.  · Avoid eating meals during the 2-3 hours before bedtime.  · Avoid lying down right after you eat.  · Do not exercise right after you eat.  Lifestyle         · If you are overweight, reduce your weight to an amount that is healthy for you. Ask your health care provider for guidance about a safe weight loss goal.  · Do not use any products that contain nicotine or tobacco, such as cigarettes, e-cigarettes, and chewing tobacco. These can make your symptoms worse. If you need help quitting, ask your health care  provider.  · Wear loose-fitting clothing. Do not wear anything tight around your waist that causes pressure on your abdomen.  · Raise (elevate) the head of your bed about 6 inches (15 cm) when you sleep.  · Try to reduce your stress, such as with yoga or meditation. If you need help reducing stress, ask your health care provider.  General instructions  · Pay attention to any changes in your symptoms.  · Take over-the-counter and prescription medicines only as told by your health care provider.  ? Do not take aspirin, ibuprofen, or other NSAIDs unless your health care provider told you to do so.  ? Stop medicines only as told by your health care provider. If you stop taking some medicines too quickly, your symptoms may get worse.  · Keep all follow-up visits as told by your health care provider. This is important.  Contact a health care provider if:  · You have new symptoms.  · You have unexplained weight loss.  · You have difficulty swallowing, or it hurts to swallow.  · You have wheezing or a persistent cough.  · Your symptoms do not improve with treatment.  · You have frequent heartburn for more than 2 weeks.  Get help right away if:  · You have pain in your arms, neck, jaw, teeth, or back.  · You feel sweaty, dizzy, or light-headed.  · You have chest pain or shortness of breath.  · You vomit and your vomit looks like blood or coffee grounds.  · Your stool is bloody or black.  These symptoms may represent a serious problem that is an emergency. Do not wait to see if the symptoms will go away. Get medical help right away. Call your local emergency services (911 in the [place] Do not drive yourself to the hospital.  Summary  · Heartburn is a type of pain or discomfort that can happen in the throat or chest. It is often described as a burning pain. It may also cause a bad, acid-like taste in the mouth.  · Avoid certain foods and drinks as told by your health care provider.  · Take over-the-counter and prescription  medicines only as told by your health care provider. Do not take aspirin, ibuprofen, or other NSAIDs unless your health care provider told you to do so.  · Contact a health care provider if your symptoms do not improve or they get worse.  This information is not intended to replace advice given to you by your health care provider. Make sure you discuss any questions you have with your health care provider.  Document Released: 05/06/2010 Document Revised: 05/20/2019 Document Reviewed: 05/20/2019  [employer] Interactive Patient Education © 2019 [employer]

## 2020-02-17 ENCOUNTER — OUTSIDE FACILITY SERVICE (OUTPATIENT)
Dept: GASTROENTEROLOGY | Facility: CLINIC | Age: 65
End: 2020-02-17

## 2020-02-17 ENCOUNTER — LAB REQUISITION (OUTPATIENT)
Dept: LAB | Facility: HOSPITAL | Age: 65
End: 2020-02-17

## 2020-02-17 DIAGNOSIS — Z00.00 ENCOUNTER FOR GENERAL ADULT MEDICAL EXAMINATION WITHOUT ABNORMAL FINDINGS: ICD-10-CM

## 2020-02-17 PROCEDURE — 88305 TISSUE EXAM BY PATHOLOGIST: CPT | Performed by: INTERNAL MEDICINE

## 2020-02-17 PROCEDURE — G0105 COLORECTAL SCRN; HI RISK IND: HCPCS | Performed by: INTERNAL MEDICINE

## 2020-02-17 PROCEDURE — 43239 EGD BIOPSY SINGLE/MULTIPLE: CPT | Performed by: INTERNAL MEDICINE

## 2020-02-18 LAB
CYTO UR: NORMAL
LAB AP CASE REPORT: NORMAL
LAB AP CLINICAL INFORMATION: NORMAL
PATH REPORT.FINAL DX SPEC: NORMAL
PATH REPORT.GROSS SPEC: NORMAL

## 2020-04-27 RX ORDER — SUMATRIPTAN 100 MG/1
TABLET, FILM COATED ORAL
Qty: 9 TABLET | Refills: 4 | Status: SHIPPED | OUTPATIENT
Start: 2020-04-27 | End: 2020-10-11

## 2020-06-10 ENCOUNTER — OFFICE VISIT (OUTPATIENT)
Dept: INTERNAL MEDICINE | Facility: CLINIC | Age: 65
End: 2020-06-10

## 2020-06-10 VITALS
HEIGHT: 62 IN | BODY MASS INDEX: 26.5 KG/M2 | HEART RATE: 98 BPM | DIASTOLIC BLOOD PRESSURE: 84 MMHG | OXYGEN SATURATION: 98 % | WEIGHT: 144 LBS | SYSTOLIC BLOOD PRESSURE: 124 MMHG | TEMPERATURE: 97 F

## 2020-06-10 DIAGNOSIS — K21.00 GASTROESOPHAGEAL REFLUX DISEASE WITH ESOPHAGITIS: ICD-10-CM

## 2020-06-10 DIAGNOSIS — R00.1 BRADYCARDIA: ICD-10-CM

## 2020-06-10 DIAGNOSIS — M54.50 CHRONIC MIDLINE LOW BACK PAIN WITHOUT SCIATICA: ICD-10-CM

## 2020-06-10 DIAGNOSIS — R73.09 ELEVATED GLUCOSE: ICD-10-CM

## 2020-06-10 DIAGNOSIS — G43.009 MIGRAINE WITHOUT AURA AND WITHOUT STATUS MIGRAINOSUS, NOT INTRACTABLE: ICD-10-CM

## 2020-06-10 DIAGNOSIS — G89.29 CHRONIC MIDLINE LOW BACK PAIN WITHOUT SCIATICA: ICD-10-CM

## 2020-06-10 DIAGNOSIS — E78.2 MIXED HYPERLIPIDEMIA: Primary | ICD-10-CM

## 2020-06-10 PROCEDURE — 93000 ELECTROCARDIOGRAM COMPLETE: CPT | Performed by: FAMILY MEDICINE

## 2020-06-10 PROCEDURE — 99214 OFFICE O/P EST MOD 30 MIN: CPT | Performed by: FAMILY MEDICINE

## 2020-06-10 PROCEDURE — G0402 INITIAL PREVENTIVE EXAM: HCPCS | Performed by: FAMILY MEDICINE

## 2020-06-10 NOTE — PROGRESS NOTES
The ABCs of the Annual Wellness Visit  Subsequent Medicare Wellness Visit    Chief Complaint   Patient presents with   • Annual Exam       Subjective   History of Present Illness:  Page B Bill is a 64 y.o. female who presents for a Subsequent Medicare Wellness Visit.    HEALTH RISK ASSESSMENT    Recent Hospitalizations:  Recently treated at the following:  Other: [facility]    Current Medical Providers:  Patient Care Team:  Claudio Bejarano Jr., MD as PCP - General (Family Medicine)    Smoking Status:  Social History     Tobacco Use   Smoking Status Never Smoker       Alcohol Consumption:  Social History     Substance and Sexual Activity   Alcohol Use No       Depression Screen:   PHQ-2/PHQ-9 Depression Screening 6/10/2020   Little interest or pleasure in doing things 0   Feeling down, depressed, or hopeless 0   Trouble falling or staying asleep, or sleeping too much 0   Feeling tired or having little energy 0   Poor appetite or overeating 0   Feeling bad about yourself - or that you are a failure or have let yourself or your family down 0   Trouble concentrating on things, such as reading the newspaper or watching television 0   Moving or speaking so slowly that other people could have noticed. Or the opposite - being so fidgety or restless that you have been moving around a lot more than usual 0   Thoughts that you would be better off dead, or of hurting yourself in some way 0   Total Score 0   If you checked off any problems, how difficult have these problems made it for you to do your work, take care of things at home, or get along with other people? Not difficult at all       Fall Risk Screen:  STEADI Fall Risk Assessment was completed, and patient is at LOW risk for falls.Assessment completed on:6/10/2020    Health Habits and Functional and Cognitive Screening:  No flowsheet data found.      Does the patient have evidence of cognitive impairment? No    Asprin use counseling:Does not need ASA (and currently  is not on it)    Age-appropriate Screening Schedule:  Refer to the list below for future screening recommendations based on patient's age, sex and/or medical conditions. Orders for these recommended tests are listed in the plan section. The patient has been provided with a written plan.    Health Maintenance   Topic Date Due   • TDAP/TD VACCINES (1 - Tdap) 10/19/1964   • ZOSTER VACCINE (1 of 2) 10/19/2003   • LIPID PANEL  06/05/2020   • INFLUENZA VACCINE  08/01/2020   • MAMMOGRAM  08/21/2021   • DXA SCAN  08/21/2021   • COLONOSCOPY  02/17/2023          The following portions of the patient's history were reviewed and updated as appropriate: allergies, current medications, past family history, past medical history, past social history, past surgical history and problem list.    Outpatient Medications Prior to Visit   Medication Sig Dispense Refill   • lansoprazole (PREVACID) 15 MG capsule Take 15 mg by mouth Daily.     • Loratadine (ALAVERT PO) Take  by mouth.     • melatonin 5 MG tablet tablet Take 5 mg by mouth.     • polyethyl glycol-propyl glycol (SYSTANE) 0.4-0.3 % solution ophthalmic solution 2 drops Every Morning.     • Probiotic Product (ALIGN PO) Take  by mouth.     • simvastatin (ZOCOR) 40 MG tablet TAKE 1 TABLET BY MOUTH EVERY NIGHT AT BEDTIME 90 tablet 2   • SUMAtriptan (IMITREX) 100 MG tablet TAKE 1 TABLET BY MOUTH TWICE DAILY AS NEEDED FOR HEADACHE 9 tablet 4     No facility-administered medications prior to visit.        Patient Active Problem List   Diagnosis   • Hyperlipidemia   • Migraine   • Gastroesophageal reflux disease with esophagitis   • Hiatal hernia   • Chronic midline low back pain without sciatica       Advanced Care Planning:  ACP discussion was held with the patient during this visit. Patient does not have an advance directive, information provided.    Review of Systems    Compared to one year ago, the patient feels her physical health is the same.  Compared to one year ago, the patient  "feels her mental health is the same.    Reviewed chart for potential of high risk medication in the elderly: not applicable  Reviewed chart for potential of harmful drug interactions in the elderly:not applicable    Objective         Vitals:    06/10/20 0922   BP: 124/84   BP Location: Left arm   Patient Position: Sitting   Cuff Size: Adult   Pulse: 98   Temp: 97 °F (36.1 °C)   SpO2: 98%   Weight: 65.3 kg (144 lb)   Height: 157.5 cm (62\")       Body mass index is 26.34 kg/m².  Discussed the patient's BMI with her. The BMI is in the acceptable range.    Physical Exam          Assessment/Plan   Medicare Risks and Personalized Health Plan  CMS Preventative Services Quick Reference  Cardiovascular risk    The above risks/problems have been discussed with the patient.  Pertinent information has been shared with the patient in the After Visit Summary.  Follow up plans and orders are seen below in the Assessment/Plan Section.    Diagnoses and all orders for this visit:    1. Mixed hyperlipidemia (Primary)  -     CBC & Differential  -     Comprehensive Metabolic Panel  -     TSH  -     T4, Free  -     T3, Free  -     Vitamin B12  -     Urinalysis With Microscopic If Indicated (No Culture) - Urine, Clean Catch  -     Lipid Panel With / Chol / HDL Ratio  -     Hemoglobin A1c    2. Migraine without aura and without status migrainosus, not intractable    3. Gastroesophageal reflux disease with esophagitis    4. Chronic midline low back pain without sciatica    5. Bradycardia  -     CBC & Differential  -     Comprehensive Metabolic Panel  -     TSH  -     T4, Free  -     T3, Free  -     Vitamin B12  -     Urinalysis With Microscopic If Indicated (No Culture) - Urine, Clean Catch  -     Lipid Panel With / Chol / HDL Ratio  -     Hemoglobin A1c  -     ECG 12 Lead    6. Elevated glucose  -     CBC & Differential  -     Comprehensive Metabolic Panel  -     TSH  -     T4, Free  -     T3, Free  -     Vitamin B12  -     Urinalysis With " Microscopic If Indicated (No Culture) - Urine, Clean Catch  -     Lipid Panel With / Chol / HDL Ratio  -     Hemoglobin A1c      Follow Up:  No follow-ups on file.     An After Visit Summary and PPPS were given to the patient.

## 2020-06-10 NOTE — PROGRESS NOTES
Subjective   Pebbles Khan is a 64 y.o. female.     Chief Complaint   Patient presents with   • Annual Exam         History of Present Illness    Pebbles is been doing symptomatically very well.  She had a history of laparoscopic colectomy for adenoma and is been doing well since.    Reviewed preop labs and EKG.  She had an EKG which had left anterior fascicular block and has some Q waves in the inferior leads without historical evidence of any cardiac problem.  EKG repeated today showed no change from 2018.  She has no symptomatic problem walking every day.    Treatment of hyperlipidemia and migraine headache are reviewed and continued.    She also takes lansoprazole for history of reflux esophagitis.    Advance Care Planning   ACP discussion was held with the patient during this visit. Patient does not have an advance directive, information provided.    The following portions of the patient's history were reviewed and updated as appropriate: allergies, current medications, past social history and problem list.    Review of Systems    Objective   Vitals:    06/10/20 0922   BP: 124/84   Pulse: 98   Temp: 97 °F (36.1 °C)   SpO2: 98%     Physical Exam   Constitutional: She is oriented to person, place, and time. She appears well-developed and well-nourished.   HENT:   Head: Normocephalic and atraumatic.   Right Ear: Tympanic membrane and external ear normal.   Left Ear: Tympanic membrane and external ear normal.   Nose: Nose normal.   Mouth/Throat: Oropharynx is clear and moist.   Eyes: Pupils are equal, round, and reactive to light. Conjunctivae and EOM are normal.   Neck: Normal range of motion. Neck supple. No JVD present. No thyromegaly present.   Cardiovascular: Normal rate, regular rhythm, normal heart sounds and intact distal pulses.  Occasional extrasystoles are present.   Pulmonary/Chest: Effort normal and breath sounds normal.   Abdominal: Soft. Bowel sounds are normal.   Musculoskeletal: Normal range of  motion.   Lymphadenopathy:     She has no cervical adenopathy.   Neurological: She is alert and oriented to person, place, and time. No cranial nerve deficit. Coordination normal.   Skin: Skin is warm and dry. No rash noted.   Psychiatric: She has a normal mood and affect. Her behavior is normal. Judgment and thought content normal.   Vitals reviewed.      Assessment/Plan   Problem List Items Addressed This Visit        Cardiovascular and Mediastinum    Hyperlipidemia - Primary    Migraine       Digestive    Gastroesophageal reflux disease with esophagitis       Nervous and Auditory    Chronic midline low back pain without sciatica      Other Visit Diagnoses     Bradycardia        Elevated glucose          Plan: Medications unchanged I talked her at length about the EKG is unchanged 2018 she has not restricted cardiac work-up for appears to be an electrical phenomenon mimicking Q waves.  If she has any symptoms whatsoever we will send her back for cardiac work-up with stress echo etc.  Otherwise medications are continued as above.  We discussed healthy lifestyle diet prevention exercise mammograms.  Recheck in a year or sooner if needed.

## 2020-06-10 NOTE — PROGRESS NOTES
Procedure     ECG 12 Lead  Date/Time: 6/10/2020 10:13 AM  Performed by: Claudio Bejarano Jr., MD  Authorized by: Claudio Bejarano Jr., MD   Comparison: compared with previous ECG from 8/8/2018  Similar to previous ECG  Rhythm: sinus rhythm  Ectopy: atrial premature contractions  Rate: normal  Conduction: incomplete right bundle branch block  Q waves: II, III and aVF    ST Segments: ST segments normal  T Waves: T waves normal    Clinical impression: abnormal EKG  Comments: Unchanged from 2018

## 2020-06-11 LAB
ALBUMIN SERPL-MCNC: 4.7 G/DL (ref 3.5–5.2)
ALBUMIN/GLOB SERPL: 1.7 G/DL
ALP SERPL-CCNC: 78 U/L (ref 39–117)
ALT SERPL-CCNC: 18 U/L (ref 1–33)
APPEARANCE UR: CLEAR
AST SERPL-CCNC: 15 U/L (ref 1–32)
BASOPHILS # BLD AUTO: 0.03 10*3/MM3 (ref 0–0.2)
BASOPHILS NFR BLD AUTO: 0.4 % (ref 0–1.5)
BILIRUB SERPL-MCNC: 0.4 MG/DL (ref 0.2–1.2)
BILIRUB UR QL STRIP: NEGATIVE
BUN SERPL-MCNC: 16 MG/DL (ref 8–23)
BUN/CREAT SERPL: 17.8 (ref 7–25)
CALCIUM SERPL-MCNC: 9.8 MG/DL (ref 8.6–10.5)
CHLORIDE SERPL-SCNC: 104 MMOL/L (ref 98–107)
CHOLEST SERPL-MCNC: 149 MG/DL (ref 0–200)
CHOLEST/HDLC SERPL: 2.66 {RATIO}
CO2 SERPL-SCNC: 23.7 MMOL/L (ref 22–29)
COLOR UR: YELLOW
CREAT SERPL-MCNC: 0.9 MG/DL (ref 0.57–1)
EOSINOPHIL # BLD AUTO: 0.04 10*3/MM3 (ref 0–0.4)
EOSINOPHIL NFR BLD AUTO: 0.5 % (ref 0.3–6.2)
ERYTHROCYTE [DISTWIDTH] IN BLOOD BY AUTOMATED COUNT: 14 % (ref 12.3–15.4)
GLOBULIN SER CALC-MCNC: 2.8 GM/DL
GLUCOSE SERPL-MCNC: 103 MG/DL (ref 65–99)
GLUCOSE UR QL: NEGATIVE
HBA1C MFR BLD: 5.7 % (ref 4.8–5.6)
HCT VFR BLD AUTO: 45 % (ref 34–46.6)
HDLC SERPL-MCNC: 56 MG/DL (ref 40–60)
HGB BLD-MCNC: 14.6 G/DL (ref 12–15.9)
HGB UR QL STRIP: NEGATIVE
IMM GRANULOCYTES # BLD AUTO: 0.02 10*3/MM3 (ref 0–0.05)
IMM GRANULOCYTES NFR BLD AUTO: 0.2 % (ref 0–0.5)
KETONES UR QL STRIP: ABNORMAL
LDLC SERPL CALC-MCNC: 67 MG/DL (ref 0–100)
LEUKOCYTE ESTERASE UR QL STRIP: NEGATIVE
LYMPHOCYTES # BLD AUTO: 0.94 10*3/MM3 (ref 0.7–3.1)
LYMPHOCYTES NFR BLD AUTO: 11.6 % (ref 19.6–45.3)
MCH RBC QN AUTO: 29 PG (ref 26.6–33)
MCHC RBC AUTO-ENTMCNC: 32.4 G/DL (ref 31.5–35.7)
MCV RBC AUTO: 89.5 FL (ref 79–97)
MONOCYTES # BLD AUTO: 0.54 10*3/MM3 (ref 0.1–0.9)
MONOCYTES NFR BLD AUTO: 6.7 % (ref 5–12)
NEUTROPHILS # BLD AUTO: 6.54 10*3/MM3 (ref 1.7–7)
NEUTROPHILS NFR BLD AUTO: 80.6 % (ref 42.7–76)
NITRITE UR QL STRIP: NEGATIVE
NRBC BLD AUTO-RTO: 0 /100 WBC (ref 0–0.2)
PH UR STRIP: <=5 [PH] (ref 5–8)
PLATELET # BLD AUTO: 295 10*3/MM3 (ref 140–450)
POTASSIUM SERPL-SCNC: 4.9 MMOL/L (ref 3.5–5.2)
PROT SERPL-MCNC: 7.5 G/DL (ref 6–8.5)
PROT UR QL STRIP: NEGATIVE
RBC # BLD AUTO: 5.03 10*6/MM3 (ref 3.77–5.28)
SODIUM SERPL-SCNC: 139 MMOL/L (ref 136–145)
SP GR UR: 1.02 (ref 1–1.03)
T3FREE SERPL-MCNC: 3 PG/ML (ref 2–4.4)
T4 FREE SERPL-MCNC: 1.14 NG/DL (ref 0.93–1.7)
TRIGL SERPL-MCNC: 132 MG/DL (ref 0–150)
TSH SERPL DL<=0.005 MIU/L-ACNC: 0.99 UIU/ML (ref 0.27–4.2)
UROBILINOGEN UR STRIP-MCNC: ABNORMAL MG/DL
VIT B12 SERPL-MCNC: 172 PG/ML (ref 211–946)
VLDLC SERPL CALC-MCNC: 26.4 MG/DL
WBC # BLD AUTO: 8.11 10*3/MM3 (ref 3.4–10.8)

## 2020-06-23 RX ORDER — SIMVASTATIN 40 MG
TABLET ORAL
Qty: 90 TABLET | Refills: 2 | Status: SHIPPED | OUTPATIENT
Start: 2020-06-23 | End: 2021-03-12 | Stop reason: SDUPTHER

## 2020-07-21 RX ORDER — LANOLIN ALCOHOL/MO/W.PET/CERES
1000 CREAM (GRAM) TOPICAL DAILY
Qty: 100 TABLET | Refills: 3 | Status: SHIPPED | OUTPATIENT
Start: 2020-07-21 | End: 2021-08-09

## 2020-10-11 RX ORDER — SUMATRIPTAN 100 MG/1
TABLET, FILM COATED ORAL
Qty: 9 TABLET | Refills: 4 | Status: SHIPPED | OUTPATIENT
Start: 2020-10-11 | End: 2021-11-08

## 2021-03-15 RX ORDER — SIMVASTATIN 40 MG
40 TABLET ORAL
Qty: 90 TABLET | Refills: 1 | Status: SHIPPED | OUTPATIENT
Start: 2021-03-15 | End: 2021-06-14 | Stop reason: SDUPTHER

## 2021-03-16 RX ORDER — SIMVASTATIN 40 MG
40 TABLET ORAL
Qty: 90 TABLET | Refills: 1 | OUTPATIENT
Start: 2021-03-16

## 2021-03-19 ENCOUNTER — BULK ORDERING (OUTPATIENT)
Dept: CASE MANAGEMENT | Facility: OTHER | Age: 66
End: 2021-03-19

## 2021-03-19 DIAGNOSIS — Z23 IMMUNIZATION DUE: ICD-10-CM

## 2021-04-29 ENCOUNTER — APPOINTMENT (OUTPATIENT)
Dept: WOMENS IMAGING | Facility: HOSPITAL | Age: 66
End: 2021-04-29

## 2021-04-29 PROCEDURE — 77063 BREAST TOMOSYNTHESIS BI: CPT | Performed by: RADIOLOGY

## 2021-04-29 PROCEDURE — 77067 SCR MAMMO BI INCL CAD: CPT | Performed by: RADIOLOGY

## 2021-05-21 ENCOUNTER — OFFICE VISIT (OUTPATIENT)
Dept: OBSTETRICS AND GYNECOLOGY | Facility: CLINIC | Age: 66
End: 2021-05-21

## 2021-05-21 VITALS
DIASTOLIC BLOOD PRESSURE: 92 MMHG | BODY MASS INDEX: 26.56 KG/M2 | WEIGHT: 145.2 LBS | SYSTOLIC BLOOD PRESSURE: 160 MMHG | HEART RATE: 77 BPM

## 2021-05-21 DIAGNOSIS — Z78.0 POST-MENOPAUSAL: ICD-10-CM

## 2021-05-21 DIAGNOSIS — Z78.0 POSTMENOPAUSAL STATE: ICD-10-CM

## 2021-05-21 DIAGNOSIS — Z01.419 ENCOUNTER FOR ROUTINE GYNECOLOGIC EXAMINATION IN MEDICARE PATIENT: Primary | ICD-10-CM

## 2021-05-21 PROCEDURE — G0101 CA SCREEN;PELVIC/BREAST EXAM: HCPCS | Performed by: OBSTETRICS & GYNECOLOGY

## 2021-05-21 NOTE — PATIENT INSTRUCTIONS
Bone Health  Bones protect organs, store calcium, anchor muscles, and support the whole body. Keeping your bones strong is important, especially as you get older. You can take actions to help keep your bones strong and healthy.  Why is keeping my bones healthy important?    Keeping your bones healthy is important because your body constantly replaces bone cells. Cells get old, and new cells take their place. As we age, we lose bone cells because the body may not be able to make enough new cells to replace the old cells. The amount of bone cells and bone tissue you have is referred to as bone mass. The higher your bone mass, the stronger your bones.  The aging process leads to an overall loss of bone mass in the body, which can increase the likelihood of:  · Joint pain and stiffness.  · Broken bones.  · A condition in which the bones become weak and brittle (osteoporosis).  A large decline in bone mass occurs in older adults. In women, it occurs about the time of menopause.  What actions can I take to keep my bones healthy?  Good health habits are important for maintaining healthy bones. This includes eating nutritious foods and exercising regularly. To have healthy bones, you need to get enough of the right minerals and vitamins. Most nutrition experts recommend getting these nutrients from the foods that you eat. In some cases, taking supplements may also be recommended. Doing certain types of exercise is also important for bone health.  What are the nutritional recommendations for healthy bones?    Eating a well-balanced diet with plenty of calcium and vitamin D will help to protect your bones. Nutritional recommendations vary from person to person. Ask your health care provider what is healthy for you. Here are some general guidelines.  Get enough calcium  Calcium is the most important (essential) mineral for bone health. Most people can get enough calcium from their diet, but supplements may be recommended for  people who are at risk for osteoporosis. Good sources of calcium include:  · Dairy products, such as low-fat or nonfat milk, cheese, and yogurt.  · Dark green leafy vegetables, such as bok aleida and broccoli.  · Calcium-fortified foods, such as orange juice, cereal, bread, soy beverages, and tofu products.  · Nuts, such as almonds.  Follow these recommended amounts for daily calcium intake:  · Children, age 1-3: 700 mg.  · Children, age 4-8: 1,000 mg.  · Children, age 9-13: 1,300 mg.  · Teens, age 14-18: 1,300 mg.  · Adults, age 19-50: 1,000 mg.  · Adults, age 51-70:  ? Men: 1,000 mg.  ? Women: 1,200 mg.  · Adults, age 71 or older: 1,200 mg.  · Pregnant and breastfeeding females:  ? Teens: 1,300 mg.  ? Adults: 1,000 mg.  Get enough vitamin D  Vitamin D is the most essential vitamin for bone health. It helps the body absorb calcium. Sunlight stimulates the skin to make vitamin D, so be sure to get enough sunlight. If you live in a cold climate or you do not get outside often, your health care provider may recommend that you take vitamin D supplements. Good sources of vitamin D in your diet include:  · Egg yolks.  · Saltwater fish.  · Milk and cereal fortified with vitamin D.  Follow these recommended amounts for daily vitamin D intake:  · Children and teens, age 1-18: 600 international units.  · Adults, age 50 or younger: 400-800 international units.  · Adults, age 51 or older: 800-1,000 international units.  Get other important nutrients  Other nutrients that are important for bone health include:  · Phosphorus. This mineral is found in meat, poultry, dairy foods, nuts, and legumes. The recommended daily intake for adult men and adult women is 700 mg.  · Magnesium. This mineral is found in seeds, nuts, dark green vegetables, and legumes. The recommended daily intake for adult men is 400-420 mg. For adult women, it is 310-320 mg.  · Vitamin K. This vitamin is found in green leafy vegetables. The recommended daily  intake is 120 mg for adult men and 90 mg for adult women.  What type of physical activity is best for building and maintaining healthy bones?  Weight-bearing and strength-building activities are important for building and maintaining healthy bones. Weight-bearing activities cause muscles and bones to work against gravity. Strength-building activities increase the strength of the muscles that support bones. Weight-bearing and muscle-building activities include:  · Walking and hiking.  · Jogging and running.  · Dancing.  · Gym exercises.  · Lifting weights.  · Tennis and racquetball.  · Climbing stairs.  · Aerobics.  Adults should get at least 30 minutes of moderate physical activity on most days. Children should get at least 60 minutes of moderate physical activity on most days. Ask your health care provider what type of exercise is best for you.  How can I find out if my bone mass is low?  Bone mass can be measured with an X-ray test called a bone mineral density (BMD) test. This test is recommended for all women who are age 65 or older. It may also be recommended for:  · Men who are age 70 or older.  · People who are at risk for osteoporosis because of:  ? Having bones that break easily.  ? Having a long-term disease that weakens bones, such as kidney disease or rheumatoid arthritis.  ? Having menopause earlier than normal.  ? Taking medicine that weakens bones, such as steroids, thyroid hormones, or hormone treatment for breast cancer or prostate cancer.  ? Smoking.  ? Drinking three or more alcoholic drinks a day.  If you find that you have a low bone mass, you may be able to prevent osteoporosis or further bone loss by changing your diet and lifestyle.  Where can I find more information?  For more information, check out the following websites:  · National Osteoporosis Foundation: www.nof.org/patients  · National Institutes of Health: www.bones.nih.gov  · International Osteoporosis Foundation:  www.iofbonehealth.org  Summary  · The aging process leads to an overall loss of bone mass in the body, which can increase the likelihood of broken bones and osteoporosis.  · Eating a well-balanced diet with plenty of calcium and vitamin D will help to protect your bones.  · Weight-bearing and strength-building activities are also important for building and maintaining strong bones.  · Bone mass can be measured with an X-ray test called a bone mineral density (BMD) test.  This information is not intended to replace advice given to you by your health care provider. Make sure you discuss any questions you have with your health care provider.  Document Revised: 01/14/2019 Document Reviewed: 01/14/2019  [employer] Patient Education © 2021 [employer]

## 2021-05-21 NOTE — PROGRESS NOTES
Chief Complaint  Gynecologic Exam -Pap-2017 Mammo- 2021 Colonoscopy-2020 DEXA-2019  Subjective          Page B Bill presents to [facility]  History of Present Illness  Patient is a 65-year-old female who presents as a new patient for gynecological exam.  She does complain of occasional bladder leakage, mainly with exercise, and uses a pad.  She states it is currently not bothersome.  Patient walks approximately 5 miles per day.  She is currently up-to-date on her screening mammogram and colonoscopy.  Her last bone density scan in 2019 showed osteopenia in her spine.    Objective   Vital Signs:   /92   Pulse 77   Wt 65.9 kg (145 lb 3.2 oz)   BMI 26.56 kg/m²     Physical Exam  Vitals reviewed. Exam conducted with a chaperone present.   Constitutional:       Appearance: She is well-developed.   Cardiovascular:      Rate and Rhythm: Normal rate and regular rhythm.   Pulmonary:      Effort: Pulmonary effort is normal.      Breath sounds: Normal breath sounds.   Chest:      Breasts:         Right: No inverted nipple, mass, nipple discharge, skin change or tenderness.         Left: No inverted nipple, mass, nipple discharge, skin change or tenderness.   Abdominal:      General: There is no distension.      Palpations: Abdomen is soft.      Tenderness: There is no abdominal tenderness.   Genitourinary:     Labia:         Right: No rash, tenderness, lesion or injury.         Left: No rash, tenderness, lesion or injury.       Urethra: No urethral lesion.      Vagina: Normal.      Cervix: Normal.      Uterus: Normal.       Adnexa:         Right: No mass, tenderness or fullness.          Left: No mass, tenderness or fullness.        Rectum: No external hemorrhoid.   Neurological:      Mental Status: She is alert.                 Assessment and Plan    Diagnoses and all orders for this visit:    1. Encounter for routine gynecologic examination in Medicare patient (Primary)    2. Postmenopausal  state  -     DEXA Bone Density Axial; Future    Patient was counseled on monthly self breast exams and yearly screening mammograms for breast health.  She was counseled on vitamin D and weight bearing exercise for bone health.  Bone density scan was ordered.      Follow Up   Return in about 2 years (around 5/21/2023) for Annual physical.  Patient was given instructions and counseling regarding her condition or for health maintenance advice. Please see specific information pulled into the AVS if appropriate.

## 2021-06-14 ENCOUNTER — OFFICE VISIT (OUTPATIENT)
Dept: INTERNAL MEDICINE | Facility: CLINIC | Age: 66
End: 2021-06-14

## 2021-06-14 VITALS
BODY MASS INDEX: 26.89 KG/M2 | SYSTOLIC BLOOD PRESSURE: 126 MMHG | HEART RATE: 90 BPM | DIASTOLIC BLOOD PRESSURE: 88 MMHG | WEIGHT: 147 LBS | OXYGEN SATURATION: 95 % | TEMPERATURE: 97.5 F

## 2021-06-14 DIAGNOSIS — E53.8 B12 DEFICIENCY: ICD-10-CM

## 2021-06-14 DIAGNOSIS — G43.009 MIGRAINE WITHOUT AURA AND WITHOUT STATUS MIGRAINOSUS, NOT INTRACTABLE: ICD-10-CM

## 2021-06-14 DIAGNOSIS — I49.1 PAC (PREMATURE ATRIAL CONTRACTION): ICD-10-CM

## 2021-06-14 DIAGNOSIS — K21.00 GASTROESOPHAGEAL REFLUX DISEASE WITH ESOPHAGITIS, UNSPECIFIED WHETHER HEMORRHAGE: ICD-10-CM

## 2021-06-14 DIAGNOSIS — R73.09 ELEVATED GLUCOSE: ICD-10-CM

## 2021-06-14 DIAGNOSIS — Z00.00 MEDICARE ANNUAL WELLNESS VISIT, SUBSEQUENT: ICD-10-CM

## 2021-06-14 DIAGNOSIS — R00.1 BRADYCARDIA: ICD-10-CM

## 2021-06-14 DIAGNOSIS — E55.9 VITAMIN D DEFICIENCY: ICD-10-CM

## 2021-06-14 DIAGNOSIS — E78.2 MIXED HYPERLIPIDEMIA: Primary | ICD-10-CM

## 2021-06-14 PROCEDURE — 93000 ELECTROCARDIOGRAM COMPLETE: CPT | Performed by: FAMILY MEDICINE

## 2021-06-14 PROCEDURE — 99214 OFFICE O/P EST MOD 30 MIN: CPT | Performed by: FAMILY MEDICINE

## 2021-06-14 PROCEDURE — G0439 PPPS, SUBSEQ VISIT: HCPCS | Performed by: FAMILY MEDICINE

## 2021-06-14 RX ORDER — SIMVASTATIN 40 MG
40 TABLET ORAL
Qty: 90 TABLET | Refills: 3 | Status: SHIPPED | OUTPATIENT
Start: 2021-06-14 | End: 2021-09-09

## 2021-06-14 RX ORDER — MULTIPLE VITAMINS W/ MINERALS TAB 9MG-400MCG
1 TAB ORAL DAILY
COMMUNITY

## 2021-06-14 NOTE — PATIENT INSTRUCTIONS
Medicare Wellness  Personal Prevention Plan of Service     Date of Office Visit:  2021  Encounter Provider:  Claudio Bejarano Jr., MD  Place of Service:  [facility]  Patient Name: Pebbles Khan  :  1955    As part of the Medicare Wellness portion of your visit today, we are providing you with this personalized preventive plan of services (PPPS). This plan is based upon recommendations of the [place] Preventive Services Task Force (USPSTF) and the Advisory Committee on Immunization Practices (ACIP).    This lists the preventive care services that should be considered, and provides dates of when you are due. Items listed as completed are up-to-date and do not require any further intervention.    Health Maintenance   Topic Date Due   • TDAP/TD VACCINES (1 - Tdap) Never done   • ZOSTER VACCINE (1 of 2) Never done   • HEPATITIS C SCREENING  Never done   • Pneumococcal Vaccine 65+ (1 of 1 - PPSV23) Never done   • PAP SMEAR  2020   • ANNUAL WELLNESS VISIT  06/10/2021   • LIPID PANEL  06/10/2021   • INFLUENZA VACCINE  2021   • DXA SCAN  2021   • COLORECTAL CANCER SCREENING  2023   • MAMMOGRAM  2023   • COVID-19 Vaccine  Completed       No orders of the defined types were placed in this encounter.      No follow-ups on file.

## 2021-06-14 NOTE — PROGRESS NOTES
The ABCs of the Annual Wellness Visit  Subsequent Medicare Wellness Visit    Chief Complaint   Patient presents with   • Medicare Wellness-subsequent       Subjective   History of Present Illness:  Page B Bill is a 65 y.o. female who presents for a Subsequent Medicare Wellness Visit.    HEALTH RISK ASSESSMENT    Recent Hospitalizations:  No hospitalization(s) within the last year.    Current Medical Providers:  Patient Care Team:  Claudio Bejarano Jr., MD as PCP - General (Family Medicine)    Smoking Status:  Social History     Tobacco Use   Smoking Status Never Smoker   Smokeless Tobacco Never Used       Alcohol Consumption:  Social History     Substance and Sexual Activity   Alcohol Use No       Depression Screen:   PHQ-2/PHQ-9 Depression Screening 6/14/2021   Little interest or pleasure in doing things 0   Feeling down, depressed, or hopeless 0   Trouble falling or staying asleep, or sleeping too much -   Feeling tired or having little energy -   Poor appetite or overeating -   Feeling bad about yourself - or that you are a failure or have let yourself or your family down -   Trouble concentrating on things, such as reading the newspaper or watching television -   Moving or speaking so slowly that other people could have noticed. Or the opposite - being so fidgety or restless that you have been moving around a lot more than usual -   Thoughts that you would be better off dead, or of hurting yourself in some way -   Total Score 0   If you checked off any problems, how difficult have these problems made it for you to do your work, take care of things at home, or get along with other people? -       Fall Risk Screen:  STEADI Fall Risk Assessment was completed, and patient is at LOW risk for falls.Assessment completed on:6/14/2021    Health Habits and Functional and Cognitive Screening:  Functional & Cognitive Status 6/14/2021   Do you have difficulty preparing food and eating? No   Do you have difficulty bathing  yourself, getting dressed or grooming yourself? No   Do you have difficulty using the toilet? No   Do you have difficulty moving around from place to place? No   Do you have trouble with steps or getting out of a bed or a chair? No   Current Diet Well Balanced Diet   Dental Exam Up to date   Eye Exam Up to date   Exercise (times per week) 7 times per week   Current Exercise Activities Include Walking   Do you need help using the phone?  No   Are you deaf or do you have serious difficulty hearing?  No   Do you need help with transportation? No   Do you need help shopping? No   Do you need help preparing meals?  No   Do you need help with housework?  No   Do you need help with laundry? No   Do you need help taking your medications? No   Do you need help managing money? No   Do you ever drive or ride in a car without wearing a seat belt? No   Have you felt unusual stress, anger or loneliness in the last month? No   Who do you live with? Spouse   If you need help, do you have trouble finding someone available to you? No   Have you been bothered in the last four weeks by sexual problems? No   Do you have difficulty concentrating, remembering or making decisions? No         Does the patient have evidence of cognitive impairment? No    Asprin use counseling:Does not need ASA (and currently is not on it)    Age-appropriate Screening Schedule:  Refer to the list below for future screening recommendations based on patient's age, sex and/or medical conditions. Orders for these recommended tests are listed in the plan section. The patient has been provided with a written plan.    Health Maintenance   Topic Date Due   • TDAP/TD VACCINES (1 - Tdap) Never done   • ZOSTER VACCINE (1 of 2) Never done   • PAP SMEAR  01/19/2020   • LIPID PANEL  06/10/2021   • INFLUENZA VACCINE  08/01/2021   • DXA SCAN  08/21/2021   • MAMMOGRAM  04/29/2023          The following portions of the patient's history were reviewed and updated as  appropriate: allergies, current medications, past family history, past medical history, past social history, past surgical history and problem list.    Outpatient Medications Prior to Visit   Medication Sig Dispense Refill   • lansoprazole (PREVACID) 15 MG capsule Take 15 mg by mouth Daily.     • Loratadine (ALAVERT PO) Take  by mouth.     • melatonin 5 MG tablet tablet Take 5 mg by mouth.     • multivitamin with minerals (MULTIVITAMIN ADULTS 50+ PO) Take 1 tablet by mouth Daily.     • polyethyl glycol-propyl glycol (SYSTANE) 0.4-0.3 % solution ophthalmic solution 2 drops Every Morning.     • simvastatin (ZOCOR) 40 MG tablet Take 1 tablet by mouth every night at bedtime. 90 tablet 1   • SUMAtriptan (IMITREX) 100 MG tablet TAKE 1 TABLET BY MOUTH TWICE DAILY AS NEEDED FOR HEADACHE 9 tablet 4   • vitamin B-12 (CYANOCOBALAMIN) 1000 MCG tablet Take 1 tablet by mouth Daily. 100 tablet 3     No facility-administered medications prior to visit.       Patient Active Problem List   Diagnosis   • Hyperlipidemia   • Migraine   • Gastroesophageal reflux disease with esophagitis   • Hiatal hernia   • Chronic midline low back pain without sciatica       Advanced Care Planning:  ACP discussion was held with the patient during this visit. Patient does not have an advance directive, information provided.    Review of Systems    Compared to one year ago, the patient feels her physical health is the same.  Compared to one year ago, the patient feels her mental health is the same.    Reviewed chart for potential of high risk medication in the elderly: not applicable  Reviewed chart for potential of harmful drug interactions in the elderly:not applicable    Objective         Vitals:    06/14/21 0956   BP: 126/88   BP Location: Left arm   Patient Position: Sitting   Cuff Size: Adult   Pulse: 90   Temp: 97.5 °F (36.4 °C)   TempSrc: Tympanic   SpO2: 95%   Weight: 66.7 kg (147 lb)   PainSc: 0-No pain       Body mass index is 26.89  kg/m².  Discussed the patient's BMI with her. The BMI is in the acceptable range.    Physical Exam          Assessment/Plan   Medicare Risks and Personalized Health Plan  CMS Preventative Services Quick Reference  Cardiovascular risk    The above risks/problems have been discussed with the patient.  Pertinent information has been shared with the patient in the After Visit Summary.  Follow up plans and orders are seen below in the Assessment/Plan Section.    Diagnoses and all orders for this visit:    1. Mixed hyperlipidemia (Primary)    2. Gastroesophageal reflux disease with esophagitis, unspecified whether hemorrhage    3. Migraine without aura and without status migrainosus, not intractable      Follow Up:  No follow-ups on file.     An After Visit Summary and PPPS were given to the patient.

## 2021-06-14 NOTE — PROGRESS NOTES
Chief Complaint  Medicare Wellness-subsequent    Subjective          Page B Bill presents to [facility]  Delightful lady with a history of GERD requiring PPI.  She has had B12 deficiency last year with improvement with oral B12.  We will check labs this year as far as B12 level.  She walks 5 miles a day and exercise is excellent she has good GYN follow-up as well.  Otherwise she is seeing gastroenterology intermittently.    Migraine headache is taking with a small dose of Imitrex.  Reviewed side effects of Imitrex and EKG shows evidence of sinus arrhythmia and left anterior fascicular block.  Is unchanged from last year.      Objective   Vital Signs:   /88 (BP Location: Left arm, Patient Position: Sitting, Cuff Size: Adult)   Pulse 90   Temp 97.5 °F (36.4 °C) (Tympanic)   Wt 66.7 kg (147 lb)   SpO2 95%   BMI 26.89 kg/m²     Physical Exam  Vitals reviewed.   Constitutional:       Appearance: She is well-developed.   HENT:      Head: Normocephalic and atraumatic.      Right Ear: Tympanic membrane and external ear normal.      Left Ear: Tympanic membrane and external ear normal.   Eyes:      Conjunctiva/sclera: Conjunctivae normal.      Pupils: Pupils are equal, round, and reactive to light.   Neck:      Thyroid: No thyromegaly.      Vascular: No JVD.   Cardiovascular:      Rate and Rhythm: Normal rate and regular rhythm. Occasional extrasystoles are present.     Heart sounds: Normal heart sounds.   Pulmonary:      Effort: Pulmonary effort is normal.      Breath sounds: Normal breath sounds.   Abdominal:      General: Bowel sounds are normal.      Palpations: Abdomen is soft.   Musculoskeletal:         General: Normal range of motion.      Cervical back: Normal range of motion and neck supple.   Lymphadenopathy:      Cervical: No cervical adenopathy.   Skin:     General: Skin is warm and dry.      Findings: No rash.   Neurological:      Mental Status: She is alert and  oriented to person, place, and time.      Cranial Nerves: No cranial nerve deficit.      Coordination: Coordination normal.   Psychiatric:         Behavior: Behavior normal.         Thought Content: Thought content normal.         Judgment: Judgment normal.        Result Review :            ECG 12 Lead    Date/Time: 6/14/2021 10:43 AM  Performed by: Claudio Bejarano Jr., MD  Authorized by: Claudio Bejarano Jr., MD   Comparison: compared with previous ECG from 6/10/2020  Similar to previous ECG  Rhythm: sinus rhythm  Ectopy: atrial premature contractions  Rate: normal  Conduction: left anterior fascicular block  ST Segments: ST segments normal  T Waves: T waves normal  QRS axis: normal  Other: no other findings    Clinical impression: non-specific ECG              Assessment and Plan    Diagnoses and all orders for this visit:    1. Mixed hyperlipidemia (Primary)  Comments:  Simvastatin 40 mg daily  Orders:  -     Vitamin B12  -     Folate  -     Methylmalonic Acid, Serum  -     Comprehensive Metabolic Panel  -     CBC & Differential  -     Magnesium  -     Urinalysis With Microscopic If Indicated (No Culture) - Urine, Clean Catch  -     TSH  -     T4, Free  -     T3, Free  -     Hemoglobin A1c  -     Vitamin D 25 Hydroxy    2. Gastroesophageal reflux disease with esophagitis, unspecified whether hemorrhage  Comments:  Lansoprazole 15 mg daily    3. Migraine without aura and without status migrainosus, not intractable  Comments:  Continue occasional Imitrex    4. Bradycardia  Comments:  Appears to be conditioning effect  Orders:  -     Vitamin B12  -     Folate  -     Methylmalonic Acid, Serum  -     Comprehensive Metabolic Panel  -     CBC & Differential  -     Magnesium  -     Urinalysis With Microscopic If Indicated (No Culture) - Urine, Clean Catch  -     TSH  -     T4, Free  -     T3, Free  -     Hemoglobin A1c  -     Vitamin D 25 Hydroxy  -     ECG 12 Lead    5. Elevated glucose  Comments:  Labs pending  Orders:  -      Hemoglobin A1c    6. PAC (premature atrial contraction)  Comments:  Labs pending  Orders:  -     Vitamin B12  -     Folate  -     Methylmalonic Acid, Serum  -     Comprehensive Metabolic Panel  -     CBC & Differential  -     Magnesium  -     Urinalysis With Microscopic If Indicated (No Culture) - Urine, Clean Catch  -     TSH  -     T4, Free  -     T3, Free  -     Hemoglobin A1c  -     Vitamin D 25 Hydroxy  -     ECG 12 Lead    7. B12 deficiency  Comments:  Check B12 folate methylmalonic acid  Orders:  -     Vitamin B12  -     Folate  -     Methylmalonic Acid, Serum    8. Vitamin D deficiency   -     Vitamin D 25 Hydroxy    9. Medicare annual wellness visit, subsequent    Other orders  -     simvastatin (ZOCOR) 40 MG tablet; Take 1 tablet by mouth every night at bedtime.  Dispense: 90 tablet; Refill: 3        Follow Up   Return in about 1 year (around 6/14/2022) for Medicare Wellness.  Patient was given instructions and counseling regarding her condition or for health maintenance advice. Please see specific information pulled into the AVS if appropriate.

## 2021-06-17 LAB
25(OH)D3+25(OH)D2 SERPL-MCNC: 32 NG/ML (ref 30–100)
ALBUMIN SERPL-MCNC: 5 G/DL (ref 3.5–5.2)
ALBUMIN/GLOB SERPL: 2 G/DL
ALP SERPL-CCNC: 82 U/L (ref 39–117)
ALT SERPL-CCNC: 22 U/L (ref 1–33)
APPEARANCE UR: CLEAR
AST SERPL-CCNC: 19 U/L (ref 1–32)
BASOPHILS # BLD AUTO: 0.04 10*3/MM3 (ref 0–0.2)
BASOPHILS NFR BLD AUTO: 0.5 % (ref 0–1.5)
BILIRUB SERPL-MCNC: 0.3 MG/DL (ref 0–1.2)
BILIRUB UR QL STRIP: NEGATIVE
BUN SERPL-MCNC: 18 MG/DL (ref 8–23)
BUN/CREAT SERPL: 20.9 (ref 7–25)
CALCIUM SERPL-MCNC: 10.3 MG/DL (ref 8.6–10.5)
CHLORIDE SERPL-SCNC: 103 MMOL/L (ref 98–107)
CO2 SERPL-SCNC: 27 MMOL/L (ref 22–29)
COLOR UR: YELLOW
CREAT SERPL-MCNC: 0.86 MG/DL (ref 0.57–1)
EOSINOPHIL # BLD AUTO: 0.04 10*3/MM3 (ref 0–0.4)
EOSINOPHIL NFR BLD AUTO: 0.5 % (ref 0.3–6.2)
ERYTHROCYTE [DISTWIDTH] IN BLOOD BY AUTOMATED COUNT: 13.2 % (ref 12.3–15.4)
FOLATE SERPL-MCNC: >20 NG/ML (ref 4.78–24.2)
GLOBULIN SER CALC-MCNC: 2.5 GM/DL
GLUCOSE SERPL-MCNC: 87 MG/DL (ref 65–99)
GLUCOSE UR QL: NEGATIVE
HBA1C MFR BLD: 5.6 % (ref 4.8–5.6)
HCT VFR BLD AUTO: 44.6 % (ref 34–46.6)
HGB BLD-MCNC: 14.6 G/DL (ref 12–15.9)
HGB UR QL STRIP: NEGATIVE
IMM GRANULOCYTES # BLD AUTO: 0.03 10*3/MM3 (ref 0–0.05)
IMM GRANULOCYTES NFR BLD AUTO: 0.4 % (ref 0–0.5)
KETONES UR QL STRIP: NEGATIVE
LEUKOCYTE ESTERASE UR QL STRIP: NEGATIVE
LYMPHOCYTES # BLD AUTO: 1.12 10*3/MM3 (ref 0.7–3.1)
LYMPHOCYTES NFR BLD AUTO: 13.6 % (ref 19.6–45.3)
Lab: NORMAL
MAGNESIUM SERPL-MCNC: 2.2 MG/DL (ref 1.6–2.4)
MCH RBC QN AUTO: 29.8 PG (ref 26.6–33)
MCHC RBC AUTO-ENTMCNC: 32.7 G/DL (ref 31.5–35.7)
MCV RBC AUTO: 91 FL (ref 79–97)
METHYLMALONATE SERPL-SCNC: 236 NMOL/L (ref 0–378)
MONOCYTES # BLD AUTO: 0.47 10*3/MM3 (ref 0.1–0.9)
MONOCYTES NFR BLD AUTO: 5.7 % (ref 5–12)
NEUTROPHILS # BLD AUTO: 6.53 10*3/MM3 (ref 1.7–7)
NEUTROPHILS NFR BLD AUTO: 79.3 % (ref 42.7–76)
NITRITE UR QL STRIP: NEGATIVE
NRBC BLD AUTO-RTO: 0 /100 WBC (ref 0–0.2)
PH UR STRIP: 5.5 [PH] (ref 5–8)
PLATELET # BLD AUTO: 295 10*3/MM3 (ref 140–450)
POTASSIUM SERPL-SCNC: 4.6 MMOL/L (ref 3.5–5.2)
PROT SERPL-MCNC: 7.5 G/DL (ref 6–8.5)
PROT UR QL STRIP: NEGATIVE
RBC # BLD AUTO: 4.9 10*6/MM3 (ref 3.77–5.28)
SODIUM SERPL-SCNC: 142 MMOL/L (ref 136–145)
SP GR UR: 1.02 (ref 1–1.03)
T3FREE SERPL-MCNC: 2.9 PG/ML (ref 2–4.4)
T4 FREE SERPL-MCNC: 1.28 NG/DL (ref 0.93–1.7)
TSH SERPL DL<=0.005 MIU/L-ACNC: 1.53 UIU/ML (ref 0.27–4.2)
UROBILINOGEN UR STRIP-MCNC: NORMAL MG/DL
VIT B12 SERPL-MCNC: 1195 PG/ML (ref 211–946)
WBC # BLD AUTO: 8.23 10*3/MM3 (ref 3.4–10.8)

## 2021-08-09 RX ORDER — LANOLIN ALCOHOL/MO/W.PET/CERES
CREAM (GRAM) TOPICAL
Qty: 100 TABLET | Refills: 3 | Status: SHIPPED | OUTPATIENT
Start: 2021-08-09 | End: 2022-09-02

## 2021-09-09 RX ORDER — SIMVASTATIN 40 MG
40 TABLET ORAL
Qty: 90 TABLET | Refills: 3 | Status: SHIPPED | OUTPATIENT
Start: 2021-09-09 | End: 2022-09-02

## 2021-11-08 RX ORDER — SUMATRIPTAN 100 MG/1
TABLET, FILM COATED ORAL
Qty: 9 TABLET | Refills: 4 | Status: SHIPPED | OUTPATIENT
Start: 2021-11-08

## 2022-05-02 ENCOUNTER — APPOINTMENT (OUTPATIENT)
Dept: WOMENS IMAGING | Facility: HOSPITAL | Age: 67
End: 2022-05-02

## 2022-05-02 PROCEDURE — 77067 SCR MAMMO BI INCL CAD: CPT | Performed by: RADIOLOGY

## 2022-05-02 PROCEDURE — 77063 BREAST TOMOSYNTHESIS BI: CPT | Performed by: RADIOLOGY

## 2022-06-17 ENCOUNTER — OFFICE VISIT (OUTPATIENT)
Dept: INTERNAL MEDICINE | Facility: CLINIC | Age: 67
End: 2022-06-17

## 2022-06-17 VITALS
HEART RATE: 84 BPM | SYSTOLIC BLOOD PRESSURE: 142 MMHG | WEIGHT: 140 LBS | DIASTOLIC BLOOD PRESSURE: 84 MMHG | OXYGEN SATURATION: 98 % | BODY MASS INDEX: 25.61 KG/M2 | TEMPERATURE: 97.3 F

## 2022-06-17 DIAGNOSIS — K21.00 GASTROESOPHAGEAL REFLUX DISEASE WITH ESOPHAGITIS, UNSPECIFIED WHETHER HEMORRHAGE: ICD-10-CM

## 2022-06-17 DIAGNOSIS — Z00.00 MEDICARE ANNUAL WELLNESS VISIT, SUBSEQUENT: ICD-10-CM

## 2022-06-17 DIAGNOSIS — E55.9 HYPOVITAMINOSIS D: ICD-10-CM

## 2022-06-17 DIAGNOSIS — R03.0 ELEVATED BLOOD PRESSURE READING: ICD-10-CM

## 2022-06-17 DIAGNOSIS — R01.0 FUNCTIONAL SYSTOLIC MURMUR: ICD-10-CM

## 2022-06-17 DIAGNOSIS — E78.2 MIXED HYPERLIPIDEMIA: Primary | ICD-10-CM

## 2022-06-17 PROCEDURE — G0439 PPPS, SUBSEQ VISIT: HCPCS | Performed by: FAMILY MEDICINE

## 2022-06-17 PROCEDURE — 1159F MED LIST DOCD IN RCRD: CPT | Performed by: FAMILY MEDICINE

## 2022-06-17 PROCEDURE — 1170F FXNL STATUS ASSESSED: CPT | Performed by: FAMILY MEDICINE

## 2022-06-17 NOTE — PROGRESS NOTES
"Chief Complaint  Medicare Wellness-subsequent    Subjective        Page B Bill presents to [facility]  Shannon is a delightful lady who has close follow-up with gastroenterology for history of high risk colon cancer screening.  She has another colonoscopy 2023.  She has a history of colon resection for high-grade polyp.    Otherwise takes lansoprazole for GERD simvastatin 40 mg daily for hyperlipidemia and sumatriptan as needed for migraine.    Blood pressure systolic is a bit elevated in the office and repeated blood pressure about 144 systolic up to 150.  She has a very early short systolic murmur of uncertain significance.  She is okay with getting an echocardiogram to take a look at this and also in reference to following the blood pressure.    She has normal follow-up with GYN.      Objective   Vital Signs:  /84 (BP Location: Left arm, Patient Position: Sitting, Cuff Size: Adult)   Pulse 84   Temp 97.3 °F (36.3 °C) (Tympanic)   Wt 63.5 kg (140 lb)   SpO2 98%   BMI 25.61 kg/m²   Estimated body mass index is 25.61 kg/m² as calculated from the following:    Height as of 6/10/20: 157.5 cm (62\").    Weight as of this encounter: 63.5 kg (140 lb).          Physical Exam  Vitals reviewed.   Constitutional:       Appearance: She is well-developed.   HENT:      Head: Normocephalic and atraumatic.      Right Ear: Tympanic membrane and external ear normal.      Left Ear: Tympanic membrane and external ear normal.   Eyes:      Conjunctiva/sclera: Conjunctivae normal.      Pupils: Pupils are equal, round, and reactive to light.   Neck:      Thyroid: No thyromegaly.      Vascular: No JVD.   Cardiovascular:      Rate and Rhythm: Normal rate and regular rhythm.      Heart sounds: Normal heart sounds.   Pulmonary:      Effort: Pulmonary effort is normal.      Breath sounds: Normal breath sounds.   Abdominal:      General: Bowel sounds are normal.      Palpations: Abdomen is soft. "   Musculoskeletal:         General: Normal range of motion.      Cervical back: Normal range of motion and neck supple.   Lymphadenopathy:      Cervical: No cervical adenopathy.   Skin:     General: Skin is warm and dry.      Findings: No rash.   Neurological:      Mental Status: She is alert and oriented to person, place, and time.      Cranial Nerves: No cranial nerve deficit.      Coordination: Coordination normal.   Psychiatric:         Behavior: Behavior normal.         Thought Content: Thought content normal.         Judgment: Judgment normal.        Result Review :                Assessment and Plan   Diagnoses and all orders for this visit:    1. Mixed hyperlipidemia (Primary)  -     Urinalysis With Microscopic If Indicated (No Culture) - Urine, Clean Catch  -     TSH  -     T4, Free  -     T3, Free  -     Lipid Panel With / Chol / HDL Ratio  -     CBC & Differential  -     Comprehensive Metabolic Panel  -     Vitamin B12  -     Vitamin D 25 Hydroxy    2. Gastroesophageal reflux disease with esophagitis, unspecified whether hemorrhage    3. Elevated blood pressure reading  -     Adult Transthoracic Echo Complete W/ Cont if Necessary Per Protocol; Future  -     Urinalysis With Microscopic If Indicated (No Culture) - Urine, Clean Catch  -     TSH  -     T4, Free  -     T3, Free  -     Lipid Panel With / Chol / HDL Ratio  -     CBC & Differential  -     Comprehensive Metabolic Panel  -     Vitamin B12  -     Vitamin D 25 Hydroxy    4. Functional systolic murmur  -     Adult Transthoracic Echo Complete W/ Cont if Necessary Per Protocol; Future  -     Urinalysis With Microscopic If Indicated (No Culture) - Urine, Clean Catch  -     TSH  -     T4, Free  -     T3, Free  -     Lipid Panel With / Chol / HDL Ratio  -     CBC & Differential  -     Comprehensive Metabolic Panel  -     Vitamin B12  -     Vitamin D 25 Hydroxy    5. Hypovitaminosis D  -     Vitamin D 25 Hydroxy             Follow Up   No follow-ups on  file.  Patient was given instructions and counseling regarding her condition or for health maintenance advice. Please see specific information pulled into the AVS if appropriate.

## 2022-06-18 LAB
25(OH)D3+25(OH)D2 SERPL-MCNC: 31.5 NG/ML (ref 30–100)
ALBUMIN SERPL-MCNC: 4.7 G/DL (ref 3.8–4.8)
ALBUMIN/GLOB SERPL: 1.7 {RATIO} (ref 1.2–2.2)
ALP SERPL-CCNC: 84 IU/L (ref 44–121)
ALT SERPL-CCNC: 16 IU/L (ref 0–32)
APPEARANCE UR: CLEAR
AST SERPL-CCNC: 17 IU/L (ref 0–40)
BACTERIA #/AREA URNS HPF: NORMAL /[HPF]
BASOPHILS # BLD AUTO: 0 X10E3/UL (ref 0–0.2)
BASOPHILS NFR BLD AUTO: 1 %
BILIRUB SERPL-MCNC: 0.4 MG/DL (ref 0–1.2)
BILIRUB UR QL STRIP: NEGATIVE
BUN SERPL-MCNC: 11 MG/DL (ref 8–27)
BUN/CREAT SERPL: 12 (ref 12–28)
CALCIUM SERPL-MCNC: 9.8 MG/DL (ref 8.7–10.3)
CASTS URNS QL MICRO: NORMAL /LPF
CHLORIDE SERPL-SCNC: 103 MMOL/L (ref 96–106)
CHOLEST SERPL-MCNC: 162 MG/DL (ref 100–199)
CHOLEST/HDLC SERPL: 3 RATIO (ref 0–4.4)
CO2 SERPL-SCNC: 23 MMOL/L (ref 20–29)
COLOR UR: YELLOW
CREAT SERPL-MCNC: 0.91 MG/DL (ref 0.57–1)
EGFRCR SERPLBLD CKD-EPI 2021: 69 ML/MIN/1.73
EOSINOPHIL # BLD AUTO: 0.1 X10E3/UL (ref 0–0.4)
EOSINOPHIL NFR BLD AUTO: 1 %
EPI CELLS #/AREA URNS HPF: NORMAL /HPF (ref 0–10)
ERYTHROCYTE [DISTWIDTH] IN BLOOD BY AUTOMATED COUNT: 13.9 % (ref 11.7–15.4)
GLOBULIN SER CALC-MCNC: 2.8 G/DL (ref 1.5–4.5)
GLUCOSE SERPL-MCNC: 101 MG/DL (ref 65–99)
GLUCOSE UR QL STRIP: NEGATIVE
HCT VFR BLD AUTO: 43.5 % (ref 34–46.6)
HDLC SERPL-MCNC: 54 MG/DL
HGB BLD-MCNC: 14.4 G/DL (ref 11.1–15.9)
HGB UR QL STRIP: NEGATIVE
IMM GRANULOCYTES # BLD AUTO: 0 X10E3/UL (ref 0–0.1)
IMM GRANULOCYTES NFR BLD AUTO: 0 %
KETONES UR QL STRIP: ABNORMAL
LDLC SERPL CALC-MCNC: 84 MG/DL (ref 0–99)
LEUKOCYTE ESTERASE UR QL STRIP: ABNORMAL
LYMPHOCYTES # BLD AUTO: 0.9 X10E3/UL (ref 0.7–3.1)
LYMPHOCYTES NFR BLD AUTO: 14 %
MCH RBC QN AUTO: 29.6 PG (ref 26.6–33)
MCHC RBC AUTO-ENTMCNC: 33.1 G/DL (ref 31.5–35.7)
MCV RBC AUTO: 90 FL (ref 79–97)
MICRO URNS: ABNORMAL
MONOCYTES # BLD AUTO: 0.5 X10E3/UL (ref 0.1–0.9)
MONOCYTES NFR BLD AUTO: 8 %
NEUTROPHILS # BLD AUTO: 5 X10E3/UL (ref 1.4–7)
NEUTROPHILS NFR BLD AUTO: 76 %
NITRITE UR QL STRIP: NEGATIVE
PH UR STRIP: 5.5 [PH] (ref 5–7.5)
PLATELET # BLD AUTO: 269 X10E3/UL (ref 150–450)
POTASSIUM SERPL-SCNC: 4.7 MMOL/L (ref 3.5–5.2)
PROT SERPL-MCNC: 7.5 G/DL (ref 6–8.5)
PROT UR QL STRIP: NEGATIVE
RBC # BLD AUTO: 4.86 X10E6/UL (ref 3.77–5.28)
RBC #/AREA URNS HPF: NORMAL /HPF (ref 0–2)
SODIUM SERPL-SCNC: 140 MMOL/L (ref 134–144)
SP GR UR STRIP: 1.02 (ref 1–1.03)
T3FREE SERPL-MCNC: 3.2 PG/ML (ref 2–4.4)
T4 FREE SERPL-MCNC: 1.21 NG/DL (ref 0.82–1.77)
TRIGL SERPL-MCNC: 135 MG/DL (ref 0–149)
TSH SERPL DL<=0.005 MIU/L-ACNC: 1.09 UIU/ML (ref 0.45–4.5)
UROBILINOGEN UR STRIP-MCNC: 0.2 MG/DL (ref 0.2–1)
VIT B12 SERPL-MCNC: 1221 PG/ML (ref 232–1245)
VLDLC SERPL CALC-MCNC: 24 MG/DL (ref 5–40)
WBC # BLD AUTO: 6.5 X10E3/UL (ref 3.4–10.8)
WBC #/AREA URNS HPF: NORMAL /HPF (ref 0–5)

## 2022-07-29 ENCOUNTER — HOSPITAL ENCOUNTER (OUTPATIENT)
Dept: CARDIOLOGY | Facility: HOSPITAL | Age: 67
Discharge: HOME | End: 2022-07-29
Admitting: FAMILY MEDICINE

## 2022-07-29 VITALS
SYSTOLIC BLOOD PRESSURE: 146 MMHG | WEIGHT: 140 LBS | BODY MASS INDEX: 25.76 KG/M2 | HEART RATE: 72 BPM | DIASTOLIC BLOOD PRESSURE: 81 MMHG | HEIGHT: 62 IN

## 2022-07-29 DIAGNOSIS — R03.0 ELEVATED BLOOD PRESSURE READING: ICD-10-CM

## 2022-07-29 DIAGNOSIS — R01.0 FUNCTIONAL SYSTOLIC MURMUR: ICD-10-CM

## 2022-07-29 LAB
AORTIC DIMENSIONLESS INDEX: 0.9 (DI)
BH CV ECHO MEAS - ACS: 1.72 CM
BH CV ECHO MEAS - AI P1/2T: 385.9 MSEC
BH CV ECHO MEAS - AO MAX PG: 12.5 MMHG
BH CV ECHO MEAS - AO MEAN PG: 6.1 MMHG
BH CV ECHO MEAS - AO V2 MAX: 176.5 CM/SEC
BH CV ECHO MEAS - AO V2 VTI: 35.3 CM
BH CV ECHO MEAS - AVA(I,D): 3 CM2
BH CV ECHO MEAS - EDV(CUBED): 123.6 ML
BH CV ECHO MEAS - EDV(MOD-SP2): 91 ML
BH CV ECHO MEAS - EDV(MOD-SP4): 76 ML
BH CV ECHO MEAS - EF(MOD-BP): 54 %
BH CV ECHO MEAS - EF(MOD-SP2): 58.2 %
BH CV ECHO MEAS - EF(MOD-SP4): 55.3 %
BH CV ECHO MEAS - ESV(CUBED): 32.7 ML
BH CV ECHO MEAS - ESV(MOD-SP2): 38 ML
BH CV ECHO MEAS - ESV(MOD-SP4): 34 ML
BH CV ECHO MEAS - FS: 35.8 %
BH CV ECHO MEAS - IVS/LVPW: 1.01 CM
BH CV ECHO MEAS - IVSD: 1.17 CM
BH CV ECHO MEAS - LAT PEAK E' VEL: 8.6 CM/SEC
BH CV ECHO MEAS - LV DIASTOLIC VOL/BSA (35-75): 46.3 CM2
BH CV ECHO MEAS - LV MASS(C)D: 221 GRAMS
BH CV ECHO MEAS - LV MAX PG: 11.2 MMHG
BH CV ECHO MEAS - LV MEAN PG: 5 MMHG
BH CV ECHO MEAS - LV SYSTOLIC VOL/BSA (12-30): 20.7 CM2
BH CV ECHO MEAS - LV V1 MAX: 167.1 CM/SEC
BH CV ECHO MEAS - LV V1 VTI: 33.3 CM
BH CV ECHO MEAS - LVIDD: 5 CM
BH CV ECHO MEAS - LVIDS: 3.2 CM
BH CV ECHO MEAS - LVOT AREA: 3.2 CM2
BH CV ECHO MEAS - LVOT DIAM: 2.02 CM
BH CV ECHO MEAS - LVPWD: 1.15 CM
BH CV ECHO MEAS - MED PEAK E' VEL: 7.5 CM/SEC
BH CV ECHO MEAS - MV A DUR: 0.12 SEC
BH CV ECHO MEAS - MV A MAX VEL: 102.8 CM/SEC
BH CV ECHO MEAS - MV DEC SLOPE: 411.2 CM/SEC2
BH CV ECHO MEAS - MV DEC TIME: 210 MSEC
BH CV ECHO MEAS - MV E MAX VEL: 109 CM/SEC
BH CV ECHO MEAS - MV E/A: 1.06
BH CV ECHO MEAS - MV MAX PG: 5.6 MMHG
BH CV ECHO MEAS - MV MEAN PG: 2.7 MMHG
BH CV ECHO MEAS - MV P1/2T: 84.1 MSEC
BH CV ECHO MEAS - MV V2 VTI: 31.9 CM
BH CV ECHO MEAS - MVA(P1/2T): 2.6 CM2
BH CV ECHO MEAS - MVA(VTI): 3.3 CM2
BH CV ECHO MEAS - PA ACC TIME: 0.1 SEC
BH CV ECHO MEAS - PA PR(ACCEL): 36.2 MMHG
BH CV ECHO MEAS - PA V2 MAX: 108.6 CM/SEC
BH CV ECHO MEAS - PI END-D VEL: 115.4 CM/SEC
BH CV ECHO MEAS - PULM A REVS DUR: 0.14 SEC
BH CV ECHO MEAS - PULM A REVS VEL: 36 CM/SEC
BH CV ECHO MEAS - PULM DIAS VEL: 43.7 CM/SEC
BH CV ECHO MEAS - PULM S/D: 0.98
BH CV ECHO MEAS - PULM SYS VEL: 42.7 CM/SEC
BH CV ECHO MEAS - RAP SYSTOLE: 3 MMHG
BH CV ECHO MEAS - RV MAX PG: 3.3 MMHG
BH CV ECHO MEAS - RV V1 MAX: 90.3 CM/SEC
BH CV ECHO MEAS - RV V1 VTI: 15.6 CM
BH CV ECHO MEAS - SI(MOD-SP2): 32.3 ML/M2
BH CV ECHO MEAS - SI(MOD-SP4): 25.6 ML/M2
BH CV ECHO MEAS - SV(LVOT): 106.8 ML
BH CV ECHO MEAS - SV(MOD-SP2): 53 ML
BH CV ECHO MEAS - SV(MOD-SP4): 42 ML
BH CV ECHO MEAS - TAPSE (>1.6): 2.26 CM
BH CV ECHO MEASUREMENTS AVERAGE E/E' RATIO: 13.54
BH CV XLRA - RV BASE: 3.7 CM
BH CV XLRA - RV LENGTH: 8.5 CM
BH CV XLRA - RV MID: 2.4 CM
BH CV XLRA - TDI S': 14.6 CM/SEC
LEFT ATRIUM VOLUME INDEX: 45.1 ML/M2
MAXIMAL PREDICTED HEART RATE: 152 BPM
SINUS: 3 CM
STRESS TARGET HR: 129 BPM

## 2022-07-29 PROCEDURE — 93306 TTE W/DOPPLER COMPLETE: CPT

## 2022-07-29 PROCEDURE — 93306 TTE W/DOPPLER COMPLETE: CPT | Performed by: INTERNAL MEDICINE

## 2022-09-02 DIAGNOSIS — E53.8 B12 DEFICIENCY: ICD-10-CM

## 2022-09-02 DIAGNOSIS — E78.2 MIXED HYPERLIPIDEMIA: Primary | ICD-10-CM

## 2022-09-02 RX ORDER — SIMVASTATIN 40 MG
40 TABLET ORAL
Qty: 90 TABLET | Refills: 3 | Status: SHIPPED | OUTPATIENT
Start: 2022-09-02

## 2022-09-02 RX ORDER — LANOLIN ALCOHOL/MO/W.PET/CERES
CREAM (GRAM) TOPICAL
Qty: 100 TABLET | Refills: 3 | Status: SHIPPED | OUTPATIENT
Start: 2022-09-02

## 2023-01-06 ENCOUNTER — TELEPHONE (OUTPATIENT)
Dept: GASTROENTEROLOGY | Facility: CLINIC | Age: 68
End: 2023-01-06
Payer: MEDICARE

## 2023-01-11 ENCOUNTER — PREP FOR SURGERY (OUTPATIENT)
Dept: SURGERY | Facility: SURGERY CENTER | Age: 68
End: 2023-01-11
Payer: MEDICARE

## 2023-01-11 DIAGNOSIS — Z12.11 ENCOUNTER FOR SCREENING FOR MALIGNANT NEOPLASM OF COLON: Primary | ICD-10-CM

## 2023-01-11 RX ORDER — SODIUM CHLORIDE, SODIUM LACTATE, POTASSIUM CHLORIDE, CALCIUM CHLORIDE 600; 310; 30; 20 MG/100ML; MG/100ML; MG/100ML; MG/100ML
30 INJECTION, SOLUTION INTRAVENOUS CONTINUOUS PRN
Status: CANCELLED | OUTPATIENT
Start: 2023-01-11

## 2023-02-23 ENCOUNTER — ANESTHESIA EVENT (OUTPATIENT)
Dept: SURGERY | Facility: SURGERY CENTER | Age: 68
End: 2023-02-23
Payer: MEDICARE

## 2023-02-23 ENCOUNTER — HOSPITAL ENCOUNTER (OUTPATIENT)
Facility: SURGERY CENTER | Age: 68
Setting detail: HOSPITAL OUTPATIENT SURGERY
Discharge: HOME | End: 2023-02-23
Attending: INTERNAL MEDICINE | Admitting: INTERNAL MEDICINE
Payer: MEDICARE

## 2023-02-23 ENCOUNTER — ANESTHESIA (OUTPATIENT)
Dept: SURGERY | Facility: SURGERY CENTER | Age: 68
End: 2023-02-23
Payer: MEDICARE

## 2023-02-23 VITALS
TEMPERATURE: 97.7 F | HEIGHT: 62 IN | SYSTOLIC BLOOD PRESSURE: 116 MMHG | RESPIRATION RATE: 16 BRPM | WEIGHT: 137 LBS | DIASTOLIC BLOOD PRESSURE: 66 MMHG | BODY MASS INDEX: 25.21 KG/M2 | OXYGEN SATURATION: 97 % | HEART RATE: 64 BPM

## 2023-02-23 DIAGNOSIS — Z12.11 ENCOUNTER FOR SCREENING FOR MALIGNANT NEOPLASM OF COLON: ICD-10-CM

## 2023-02-23 PROCEDURE — G0105 COLORECTAL SCRN; HI RISK IND: HCPCS | Performed by: INTERNAL MEDICINE

## 2023-02-23 PROCEDURE — 25010000002 PROPOFOL 10 MG/ML EMULSION: Performed by: ANESTHESIOLOGY

## 2023-02-23 RX ORDER — PROPOFOL 10 MG/ML
VIAL (ML) INTRAVENOUS CONTINUOUS PRN
Status: DISCONTINUED | OUTPATIENT
Start: 2023-02-23 | End: 2023-02-23 | Stop reason: SURG

## 2023-02-23 RX ORDER — LIDOCAINE HYDROCHLORIDE 20 MG/ML
INJECTION, SOLUTION INFILTRATION; PERINEURAL AS NEEDED
Status: DISCONTINUED | OUTPATIENT
Start: 2023-02-23 | End: 2023-02-23 | Stop reason: SURG

## 2023-02-23 RX ORDER — ONDANSETRON 2 MG/ML
4 INJECTION INTRAMUSCULAR; INTRAVENOUS ONCE AS NEEDED
Status: DISCONTINUED | OUTPATIENT
Start: 2023-02-23 | End: 2023-02-23 | Stop reason: HOSPADM

## 2023-02-23 RX ORDER — SODIUM CHLORIDE 0.9 % (FLUSH) 0.9 %
3 SYRINGE (ML) INJECTION EVERY 12 HOURS SCHEDULED
Status: DISCONTINUED | OUTPATIENT
Start: 2023-02-23 | End: 2023-02-23 | Stop reason: HOSPADM

## 2023-02-23 RX ORDER — SODIUM CHLORIDE 0.9 % (FLUSH) 0.9 %
10 SYRINGE (ML) INJECTION AS NEEDED
Status: DISCONTINUED | OUTPATIENT
Start: 2023-02-23 | End: 2023-02-23 | Stop reason: HOSPADM

## 2023-02-23 RX ORDER — PROPOFOL 10 MG/ML
VIAL (ML) INTRAVENOUS AS NEEDED
Status: DISCONTINUED | OUTPATIENT
Start: 2023-02-23 | End: 2023-02-23 | Stop reason: SURG

## 2023-02-23 RX ORDER — SODIUM CHLORIDE, SODIUM LACTATE, POTASSIUM CHLORIDE, CALCIUM CHLORIDE 600; 310; 30; 20 MG/100ML; MG/100ML; MG/100ML; MG/100ML
30 INJECTION, SOLUTION INTRAVENOUS CONTINUOUS PRN
Status: DISCONTINUED | OUTPATIENT
Start: 2023-02-23 | End: 2023-02-23 | Stop reason: HOSPADM

## 2023-02-23 RX ADMIN — SODIUM CHLORIDE, SODIUM LACTATE, POTASSIUM CHLORIDE, AND CALCIUM CHLORIDE: .6; .31; .03; .02 INJECTION, SOLUTION INTRAVENOUS at 07:37

## 2023-02-23 RX ADMIN — PROPOFOL 80 MG: 10 INJECTION, EMULSION INTRAVENOUS at 07:37

## 2023-02-23 RX ADMIN — Medication 200 MCG/KG/MIN: at 07:37

## 2023-02-23 RX ADMIN — LIDOCAINE HYDROCHLORIDE 50 MG: 20 INJECTION, SOLUTION INFILTRATION; PERINEURAL at 07:37

## 2023-02-23 NOTE — ANESTHESIA PREPROCEDURE EVALUATION
Anesthesia Evaluation     Patient summary reviewed and Nursing notes reviewed                Airway   Mallampati: II  Dental - normal exam     Pulmonary - negative pulmonary ROS and normal exam   Cardiovascular - normal exam    (+) hyperlipidemia,       Neuro/Psych  GI/Hepatic/Renal/Endo    (+)  hiatal hernia, GERD,      Musculoskeletal (-) negative ROS    Abdominal    Substance History - negative use     OB/GYN          Other - negative ROS                     Anesthesia Plan    ASA 2     MAC       Anesthetic plan, risks, benefits, and alternatives have been provided, discussed and informed consent has been obtained with: patient.        CODE STATUS:

## 2023-02-23 NOTE — ANESTHESIA POSTPROCEDURE EVALUATION
"Patient: Pebbles Khan    Procedure Summary     Date: 02/23/23 Room / Location: [facility] / [facility]    Anesthesia Start: 0732 Anesthesia Stop: 0753    Procedure: COLONOSCOPY FOR SCREENING- 3 YEAR SCREENING Diagnosis:       Encounter for screening for malignant neoplasm of colon      (Encounter for screening for malignant neoplasm of colon [Z12.11])    Surgeons: Mikael Steiner MD Provider: Diana Santamaria MD    Anesthesia Type: MAC ASA Status: 2          Anesthesia Type: MAC    Vitals  Vitals Value Taken Time   /66 02/23/23 0810   Temp 36.5 °C (97.7 °F) 02/23/23 0753   Pulse 64 02/23/23 0810   Resp 16 02/23/23 0810   SpO2 97 % 02/23/23 0810           Post Anesthesia Care and Evaluation    Patient location during evaluation: PHASE II  Patient participation: complete - patient participated  Level of consciousness: awake  Pain management: adequate    Airway patency: patent  Anesthetic complications: No anesthetic complications    Cardiovascular status: acceptable  Respiratory status: acceptable  Hydration status: acceptable    Comments: /66   Pulse 64   Temp 36.5 °C (97.7 °F) (Tympanic)   Resp 16   Ht 157.5 cm (62\")   Wt 62.1 kg (137 lb)   SpO2 97%   BMI 25.06 kg/m²       "

## 2023-09-25 DIAGNOSIS — E53.8 B12 DEFICIENCY: ICD-10-CM

## 2023-09-25 RX ORDER — LANOLIN ALCOHOL/MO/W.PET/CERES
CREAM (GRAM) TOPICAL
Qty: 100 TABLET | Refills: 3 | Status: SHIPPED | OUTPATIENT
Start: 2023-09-25

## 2023-09-25 NOTE — TELEPHONE ENCOUNTER
Rx Refill Note  Requested Prescriptions     Pending Prescriptions Disp Refills    vitamin B-12 (CYANOCOBALAMIN) 1000 MCG tablet [Pharmacy Med Name: VITAMIN B-12 1000MCG TABLETS] 100 tablet 3     Sig: TAKE 1 TABLET BY MOUTH EVERY DAY      Last office visit with prescribing clinician: 6/22/2023   Last telemedicine visit with prescribing clinician: Visit date not found   Next office visit with prescribing clinician: 7/1/2024       Radha Wall MA  09/25/23, 09:15 EDT

## 2024-06-13 DIAGNOSIS — E78.2 MIXED HYPERLIPIDEMIA: ICD-10-CM

## 2024-06-13 RX ORDER — SIMVASTATIN 40 MG
40 TABLET ORAL
Qty: 90 TABLET | Refills: 3 | Status: SHIPPED | OUTPATIENT
Start: 2024-06-13

## 2024-07-01 ENCOUNTER — OFFICE VISIT (OUTPATIENT)
Dept: INTERNAL MEDICINE | Facility: CLINIC | Age: 69
End: 2024-07-01
Payer: MEDICARE

## 2024-07-01 VITALS
WEIGHT: 138 LBS | OXYGEN SATURATION: 98 % | BODY MASS INDEX: 25.24 KG/M2 | SYSTOLIC BLOOD PRESSURE: 136 MMHG | HEART RATE: 70 BPM | DIASTOLIC BLOOD PRESSURE: 84 MMHG

## 2024-07-01 DIAGNOSIS — G43.009 MIGRAINE WITHOUT AURA AND WITHOUT STATUS MIGRAINOSUS, NOT INTRACTABLE: ICD-10-CM

## 2024-07-01 DIAGNOSIS — Z00.00 MEDICARE ANNUAL WELLNESS VISIT, SUBSEQUENT: ICD-10-CM

## 2024-07-01 DIAGNOSIS — R73.09 ELEVATED GLUCOSE: ICD-10-CM

## 2024-07-01 DIAGNOSIS — E55.9 HYPOVITAMINOSIS D: ICD-10-CM

## 2024-07-01 DIAGNOSIS — I49.1 PAC (PREMATURE ATRIAL CONTRACTION): ICD-10-CM

## 2024-07-01 DIAGNOSIS — R03.0 ELEVATED BLOOD PRESSURE READING: ICD-10-CM

## 2024-07-01 DIAGNOSIS — K21.00 GASTROESOPHAGEAL REFLUX DISEASE WITH ESOPHAGITIS, UNSPECIFIED WHETHER HEMORRHAGE: ICD-10-CM

## 2024-07-01 DIAGNOSIS — I35.1 NONRHEUMATIC AORTIC VALVE INSUFFICIENCY: ICD-10-CM

## 2024-07-01 DIAGNOSIS — E78.2 MIXED HYPERLIPIDEMIA: Primary | ICD-10-CM

## 2024-07-01 PROCEDURE — G0439 PPPS, SUBSEQ VISIT: HCPCS | Performed by: FAMILY MEDICINE

## 2024-07-01 PROCEDURE — 1126F AMNT PAIN NOTED NONE PRSNT: CPT | Performed by: FAMILY MEDICINE

## 2024-07-01 PROCEDURE — 93000 ELECTROCARDIOGRAM COMPLETE: CPT | Performed by: FAMILY MEDICINE

## 2024-07-01 PROCEDURE — 1159F MED LIST DOCD IN RCRD: CPT | Performed by: FAMILY MEDICINE

## 2024-07-01 PROCEDURE — 99214 OFFICE O/P EST MOD 30 MIN: CPT | Performed by: FAMILY MEDICINE

## 2024-07-01 PROCEDURE — 1160F RVW MEDS BY RX/DR IN RCRD: CPT | Performed by: FAMILY MEDICINE

## 2024-07-01 RX ORDER — SUMATRIPTAN 100 MG/1
100 TABLET, FILM COATED ORAL
Qty: 9 TABLET | Refills: 4 | Status: SHIPPED | OUTPATIENT
Start: 2024-07-01

## 2024-07-01 NOTE — PROGRESS NOTES
The ABCs of the Annual Wellness Visit  Medicare Wellness Visit    Subjective     Page B Bill is a 68 y.o. female who presents for a Medicare Wellness Visit.    The following portions of the patient's history were reviewed and   updated as appropriate: allergies, current medications, past family history, past medical history, past social history, past surgical history, and problem list.     Compared to one year ago, the patient feels her physical   health is the same.    Compared to one year ago, the patient feels her mental   health is the same.    Recent Hospitalizations:  She was not admitted to the hospital during the last year.       Current Medical Providers:  Patient Care Team:  Claudio Bejarano MD as PCP - General (Family Medicine)    Outpatient Medications Prior to Visit   Medication Sig Dispense Refill    lansoprazole (PREVACID) 15 MG capsule Take 1 capsule by mouth Daily.      melatonin 5 MG tablet tablet Take 1 tablet by mouth.      multivitamin with minerals tablet tablet Take 1 tablet by mouth Daily.      polyethyl glycol-propyl glycol (SYSTANE) 0.4-0.3 % solution ophthalmic solution 2 drops Every Morning.      Saccharomyces boulardii (FLORASTOR PO) Take  by mouth 2 (Two) Times a Day.      simvastatin (ZOCOR) 40 MG tablet TAKE 1 TABLET BY MOUTH EVERY NIGHT AT BEDTIME 90 tablet 3    vitamin B-12 (CYANOCOBALAMIN) 1000 MCG tablet TAKE 1 TABLET BY MOUTH EVERY  tablet 3    SUMAtriptan (IMITREX) 100 MG tablet Take 1 tablet by mouth Every 2 (Two) Hours As Needed for Migraine. Take one tablet at onset of headache. May repeat dose one time in 2 hours if headache not relieved. 9 tablet 4    Loratadine (ALAVERT PO) Take  by mouth.       No facility-administered medications prior to visit.       No opioid medication identified on active medication list. I have reviewed chart for other potential  high risk medication/s and harmful drug interactions in the elderly.        Aspirin is not on active medication  "list.  Aspirin use is not indicated based on review of current medical condition/s. Risk of harm outweighs potential benefits.  .    Patient Active Problem List   Diagnosis    Hyperlipidemia    Migraine    Gastroesophageal reflux disease with esophagitis    Hiatal hernia    Chronic midline low back pain without sciatica    Elevated blood pressure reading    Medicare annual wellness visit, subsequent    Hypovitaminosis D    Nonrheumatic aortic valve insufficiency    PAC (premature atrial contraction)     Advance Care Planning   Advance Care Planning     Advance Directive is not on file.  ACP discussion was held with the patient during this visit. Patient does not have an advance directive, information provided.       Objective    Vitals:    07/01/24 0947   BP: 136/84   BP Location: Left arm   Patient Position: Sitting   Cuff Size: Adult   Pulse: 70   SpO2: 98%   Weight: 62.6 kg (138 lb)     Estimated body mass index is 25.24 kg/m² as calculated from the following:    Height as of 6/22/23: 157.5 cm (62\").    Weight as of this encounter: 62.6 kg (138 lb).    BMI is >= 25 and <30. (Overweight) The following options were offered after discussion;: exercise counseling/recommendations and nutrition counseling/recommendations      Does the patient have evidence of cognitive impairment?   No          HEALTH RISK ASSESSMENT    Smoking Status:  Social History     Tobacco Use   Smoking Status Never   Smokeless Tobacco Never     Alcohol Consumption:  Social History     Substance and Sexual Activity   Alcohol Use No     Fall Risk Screen:    STEADI Fall Risk Assessment was completed, and patient is at LOW risk for falls.Assessment completed on:7/1/2024    Depression Screen:       7/1/2024     9:52 AM   PHQ-2/PHQ-9 Depression Screening   Little Interest or Pleasure in Doing Things 0-->not at all   Feeling Down, Depressed or Hopeless 0-->not at all   PHQ-9: Brief Depression Severity Measure Score 0       Health Habits and Functional " and Cognitive Screenin/1/2024     9:49 AM   Functional & Cognitive Status   Do you have difficulty preparing food and eating? No   Do you have difficulty bathing yourself, getting dressed or grooming yourself? No   Do you have difficulty using the toilet? No   Do you have difficulty moving around from place to place? No   Do you have trouble with steps or getting out of a bed or a chair? No   Current Diet Well Balanced Diet   Dental Exam Up to date   Eye Exam Up to date   Exercise (times per week) 7 times per week   Current Exercises Include Walking   Do you need help using the phone?  No   Are you deaf or do you have serious difficulty hearing?  No   Do you need help to go to places out of walking distance? No   Do you need help shopping? No   Do you need help preparing meals?  No   Do you need help with housework?  No   Do you need help with laundry? No   Do you need help taking your medications? No   Do you need help managing money? No   Do you ever drive or ride in a car without wearing a seat belt? No   Have you felt unusual stress, anger or loneliness in the last month? No   Who do you live with? Spouse   If you need help, do you have trouble finding someone available to you? No   Have you been bothered in the last four weeks by sexual problems? No   Do you have difficulty concentrating, remembering or making decisions? No       Age-appropriate Screening Schedule:  Refer to the list below for future screening recommendations based on patient's age, sex and/or medical conditions. Orders for these recommended tests are listed in the plan section. The patient has been provided with a written plan.    Health Maintenance   Topic Date Due    TDAP/TD VACCINES (1 - Tdap) Never done    ZOSTER VACCINE (1 of 2) Never done    RSV Vaccine - Adults (1 - 1-dose 60+ series) Never done    HEPATITIS C SCREENING  Never done    DXA SCAN  2021    COVID-19 Vaccine (2023- season) 2023    LIPID PANEL   06/22/2024    Pneumococcal Vaccine 65+ (1 of 1 - PCV) 08/22/2024 (Originally 10/19/2018)    INFLUENZA VACCINE  08/01/2024    PAP SMEAR  06/15/2025    MAMMOGRAM  06/26/2025    ANNUAL WELLNESS VISIT  07/01/2025    COLORECTAL CANCER SCREENING  02/23/2028          CMS Preventative Services Quick Reference  Risk Factors Identified During Encounter    None Identified    The above risks/problems have been discussed with the patient.  Pertinent information has been shared with the patient in the After Visit Summary.  An After Visit Summary and PPPS were made available to the patient.  Diagnoses and all orders for this visit:    1. Mixed hyperlipidemia (Primary)  -     Urinalysis With Microscopic If Indicated (No Culture) - Urine, Clean Catch  -     TSH  -     T4, Free  -     T3, Free  -     Lipid Panel With / Chol / HDL Ratio  -     CBC & Differential  -     Comprehensive Metabolic Panel  -     Hemoglobin A1c  -     Vitamin B12  -     Vitamin D,25-Hydroxy    2. Elevated blood pressure reading  -     Urinalysis With Microscopic If Indicated (No Culture) - Urine, Clean Catch  -     TSH  -     T4, Free  -     T3, Free  -     Lipid Panel With / Chol / HDL Ratio  -     CBC & Differential  -     Comprehensive Metabolic Panel  -     Hemoglobin A1c  -     Vitamin B12  -     Vitamin D,25-Hydroxy    3. Hypovitaminosis D  -     Vitamin D,25-Hydroxy    4. Gastroesophageal reflux disease with esophagitis, unspecified whether hemorrhage    5. Medicare annual wellness visit, subsequent    6. Nonrheumatic aortic valve insufficiency    7. PAC (premature atrial contraction)  -     Urinalysis With Microscopic If Indicated (No Culture) - Urine, Clean Catch  -     TSH  -     T4, Free  -     T3, Free  -     Lipid Panel With / Chol / HDL Ratio  -     CBC & Differential  -     Comprehensive Metabolic Panel  -     Hemoglobin A1c  -     Vitamin B12  -     Vitamin D,25-Hydroxy    8. Migraine without aura and without status migrainosus, not  intractable  -     SUMAtriptan (IMITREX) 100 MG tablet; Take 1 tablet by mouth Every 2 (Two) Hours As Needed for Migraine. Take one tablet at onset of headache. May repeat dose one time in 2 hours if headache not relieved.  Dispense: 9 tablet; Refill: 4    9. Elevated glucose  -     Hemoglobin A1c    Other orders  -     ECG 12 Lead      Follow Up:  Next Medicare Wellness visit to be scheduled in 1 year.        Additional E&M Note during same encounter follows:  Patient has multiple medical problems which are significant and separately identifiable that require additional work above and beyond the Medicare Wellness Visit.      Chief Complaint  Medicare Wellness-subsequent    Subjective        Patient is a delightful lady who is very active and appears stable in all regards.  She is treated for intermittent migraine headache With as needed Imitrex plus simvastatin 40 mg daily for cholesterol and lansoprazole for reflux.  We discussed aortic regurgitation which appears stable and occasional PACs.  Will get an EKG today.  She would prefer to get mammograms every other year perhaps and also consider getting a DEXA scan next year.      Page B Bill is also being seen today for     Review of Systems   All other systems reviewed and are negative.      Objective   Vital Signs:  /84 (BP Location: Left arm, Patient Position: Sitting, Cuff Size: Adult)   Pulse 70   Wt 62.6 kg (138 lb)   SpO2 98%   BMI 25.24 kg/m²     Physical Exam  Vitals reviewed.   Constitutional:       Appearance: She is well-developed.   HENT:      Head: Normocephalic and atraumatic.      Right Ear: Tympanic membrane and external ear normal.      Left Ear: Tympanic membrane and external ear normal.      Nose: Nose normal.   Eyes:      Conjunctiva/sclera: Conjunctivae normal.      Pupils: Pupils are equal, round, and reactive to light.   Neck:      Thyroid: No thyromegaly.      Vascular: No JVD.   Cardiovascular:      Rate and Rhythm: Normal rate  and regular rhythm. Occasional Extrasystoles are present.     Heart sounds: Normal heart sounds.   Pulmonary:      Effort: Pulmonary effort is normal.      Breath sounds: Normal breath sounds.   Abdominal:      General: Bowel sounds are normal.      Palpations: Abdomen is soft.   Musculoskeletal:         General: Normal range of motion.      Cervical back: Normal range of motion and neck supple.   Lymphadenopathy:      Cervical: No cervical adenopathy.   Skin:     General: Skin is warm and dry.      Findings: No rash.   Neurological:      Mental Status: She is alert and oriented to person, place, and time.      Cranial Nerves: No cranial nerve deficit.      Coordination: Coordination normal.   Psychiatric:         Behavior: Behavior normal.         Thought Content: Thought content normal.         Judgment: Judgment normal.          The following data was reviewed by: Claudio Bejarano MD on 07/01/2024:        ECG 12 Lead    Date/Time: 7/1/2024 10:47 AM  Performed by: Claudio Bejarano MD    Authorized by: Claudio Bejarano MD  Comparison: compared with previous ECG from 6/14/2021  Similar to previous ECG  Rhythm: sinus rhythm  Ectopy: atrial premature contractions  Rate: normal  Conduction: left anterior fascicular block  ST Segments: ST segments normal  T Waves: T waves normal  QRS axis: normal    Clinical impression: abnormal EKG              Assessment and Plan     Assessment & Plan  Mixed hyperlipidemia    simvastatin 40 mg at bedtime  Elevated blood pressure reading  No evidence of progressive hypertension  Hypovitaminosis D    Gastroesophageal reflux disease with esophagitis, unspecified whether hemorrhage  Continue Prevacid generic 50 mg daily  Medicare annual wellness visit, subsequent    Nonrheumatic aortic valve insufficiency  On clinical exam seems stable reviewed echo from 2022 will get EKG  PAC (premature atrial contraction)  Double check EKG  Migraine without aura and without status migrainosus, not  intractable      Refill simvastatin      Elevated glucose      Orders Placed This Encounter   Procedures    Urinalysis With Microscopic If Indicated (No Culture) - Urine, Clean Catch    TSH    T4, Free    T3, Free    Lipid Panel With / Chol / HDL Ratio    Comprehensive Metabolic Panel    Hemoglobin A1c    Vitamin B12    Vitamin D,25-Hydroxy    ECG 12 Lead    CBC & Differential     New Medications Ordered This Visit   Medications    SUMAtriptan (IMITREX) 100 MG tablet     Sig: Take 1 tablet by mouth Every 2 (Two) Hours As Needed for Migraine. Take one tablet at onset of headache. May repeat dose one time in 2 hours if headache not relieved.     Dispense:  9 tablet     Refill:  4               Follow Up   No follow-ups on file.  Patient was given instructions and counseling regarding her condition or for health maintenance advice. Please see specific information pulled into the AVS if appropriate.

## 2024-07-02 LAB
25(OH)D3+25(OH)D2 SERPL-MCNC: 36.4 NG/ML (ref 30–100)
ALBUMIN SERPL-MCNC: 4.5 G/DL (ref 3.9–4.9)
ALP SERPL-CCNC: 83 IU/L (ref 44–121)
ALT SERPL-CCNC: 16 IU/L (ref 0–32)
APPEARANCE UR: CLEAR
AST SERPL-CCNC: 21 IU/L (ref 0–40)
BASOPHILS # BLD AUTO: 0 X10E3/UL (ref 0–0.2)
BASOPHILS NFR BLD AUTO: 0 %
BILIRUB SERPL-MCNC: 0.4 MG/DL (ref 0–1.2)
BILIRUB UR QL STRIP: NEGATIVE
BUN SERPL-MCNC: 14 MG/DL (ref 8–27)
BUN/CREAT SERPL: 17 (ref 12–28)
CALCIUM SERPL-MCNC: 9.8 MG/DL (ref 8.7–10.3)
CHLORIDE SERPL-SCNC: 104 MMOL/L (ref 96–106)
CHOLEST SERPL-MCNC: 160 MG/DL (ref 100–199)
CHOLEST/HDLC SERPL: 3.1 RATIO (ref 0–4.4)
CO2 SERPL-SCNC: 25 MMOL/L (ref 20–29)
COLOR UR: YELLOW
CREAT SERPL-MCNC: 0.82 MG/DL (ref 0.57–1)
EGFRCR SERPLBLD CKD-EPI 2021: 77 ML/MIN/1.73
EOSINOPHIL # BLD AUTO: 0.1 X10E3/UL (ref 0–0.4)
EOSINOPHIL NFR BLD AUTO: 1 %
ERYTHROCYTE [DISTWIDTH] IN BLOOD BY AUTOMATED COUNT: 13.5 % (ref 11.7–15.4)
GLOBULIN SER CALC-MCNC: 2.7 G/DL (ref 1.5–4.5)
GLUCOSE SERPL-MCNC: 84 MG/DL (ref 70–99)
GLUCOSE UR QL STRIP: NEGATIVE
HBA1C MFR BLD: 5.6 % (ref 4.8–5.6)
HCT VFR BLD AUTO: 44.3 % (ref 34–46.6)
HDLC SERPL-MCNC: 52 MG/DL
HGB BLD-MCNC: 14.2 G/DL (ref 11.1–15.9)
HGB UR QL STRIP: NEGATIVE
IMM GRANULOCYTES # BLD AUTO: 0 X10E3/UL (ref 0–0.1)
IMM GRANULOCYTES NFR BLD AUTO: 0 %
KETONES UR QL STRIP: NEGATIVE
LDLC SERPL CALC-MCNC: 79 MG/DL (ref 0–99)
LEUKOCYTE ESTERASE UR QL STRIP: NEGATIVE
LYMPHOCYTES # BLD AUTO: 1 X10E3/UL (ref 0.7–3.1)
LYMPHOCYTES NFR BLD AUTO: 15 %
MCH RBC QN AUTO: 29.5 PG (ref 26.6–33)
MCHC RBC AUTO-ENTMCNC: 32.1 G/DL (ref 31.5–35.7)
MCV RBC AUTO: 92 FL (ref 79–97)
MICRO URNS: NORMAL
MONOCYTES # BLD AUTO: 0.4 X10E3/UL (ref 0.1–0.9)
MONOCYTES NFR BLD AUTO: 7 %
NEUTROPHILS # BLD AUTO: 4.9 X10E3/UL (ref 1.4–7)
NEUTROPHILS NFR BLD AUTO: 77 %
NITRITE UR QL STRIP: NEGATIVE
PH UR STRIP: 5.5 [PH] (ref 5–7.5)
PLATELET # BLD AUTO: 285 X10E3/UL (ref 150–450)
POTASSIUM SERPL-SCNC: 5 MMOL/L (ref 3.5–5.2)
PROT SERPL-MCNC: 7.2 G/DL (ref 6–8.5)
PROT UR QL STRIP: NEGATIVE
RBC # BLD AUTO: 4.82 X10E6/UL (ref 3.77–5.28)
SODIUM SERPL-SCNC: 141 MMOL/L (ref 134–144)
SP GR UR STRIP: 1.02 (ref 1–1.03)
T3FREE SERPL-MCNC: 3 PG/ML (ref 2–4.4)
T4 FREE SERPL-MCNC: 1.19 NG/DL (ref 0.82–1.77)
TRIGL SERPL-MCNC: 170 MG/DL (ref 0–149)
TSH SERPL DL<=0.005 MIU/L-ACNC: 1.75 UIU/ML (ref 0.45–4.5)
UROBILINOGEN UR STRIP-MCNC: 0.2 MG/DL (ref 0.2–1)
VIT B12 SERPL-MCNC: 1499 PG/ML (ref 232–1245)
VLDLC SERPL CALC-MCNC: 29 MG/DL (ref 5–40)
WBC # BLD AUTO: 6.4 X10E3/UL (ref 3.4–10.8)

## 2024-10-16 DIAGNOSIS — E53.8 B12 DEFICIENCY: ICD-10-CM

## 2024-10-16 RX ORDER — LANOLIN ALCOHOL/MO/W.PET/CERES
CREAM (GRAM) TOPICAL
Qty: 100 TABLET | Refills: 3 | Status: SHIPPED | OUTPATIENT
Start: 2024-10-16

## 2024-10-16 NOTE — TELEPHONE ENCOUNTER
Rx Refill Note  Requested Prescriptions     Pending Prescriptions Disp Refills    vitamin B-12 (CYANOCOBALAMIN) 1000 MCG tablet [Pharmacy Med Name: VITAMIN B-12 1000MCG TABLETS] 100 tablet 3     Sig: TAKE 1 TABLET BY MOUTH EVERY DAY      Last office visit with prescribing clinician: 7/1/2024   Last telemedicine visit with prescribing clinician: Visit date not found   Next office visit with prescribing clinician: 7/7/2025                         Would you like a call back once the refill request has been completed: [] Yes [] No    If the office needs to give you a call back, can they leave a voicemail: [] Yes [] No    Travon Wheeler MA  10/16/24, 08:12 EDT

## 2025-02-08 DIAGNOSIS — R11.2 NAUSEA AND VOMITING, UNSPECIFIED VOMITING TYPE: Primary | ICD-10-CM

## 2025-02-08 RX ORDER — PROMETHAZINE HYDROCHLORIDE 25 MG/1
25 SUPPOSITORY RECTAL EVERY 6 HOURS PRN
Qty: 7 SUPPOSITORY | Refills: 0 | Status: ON HOLD | OUTPATIENT
Start: 2025-02-08

## 2025-02-08 NOTE — PROGRESS NOTES
Complaining of nausea, vomiting and diarrhea.  Patient  states that he been diagnosed previously with a virus in urgent care patient has same symptoms.  Patient has taken Zofran, however this was  in 2019.  Patient states this did not work.  Patient has been unable to keep fluids down.  Patient states this has been about 3 days.  Educated patient on dosing and side effects of promethazine suppository.  Patient states she has taken promethazine in the past and has tolerated this fine.  Educated patient if she is not improved and been able to keep fluids down by this evening to be seen in the ER for possible IV fluid therapy, and IV antiemetics.  Patient verbalized understanding and is comfortable with the plan of care.

## 2025-02-09 ENCOUNTER — HOSPITAL ENCOUNTER (INPATIENT)
Facility: HOSPITAL | Age: 70
LOS: 2 days | Discharge: HOME | End: 2025-02-12
Attending: EMERGENCY MEDICINE | Admitting: STUDENT IN AN ORGANIZED HEALTH CARE EDUCATION/TRAINING PROGRAM
Payer: MEDICARE

## 2025-02-09 ENCOUNTER — APPOINTMENT (OUTPATIENT)
Dept: GENERAL RADIOLOGY | Facility: HOSPITAL | Age: 70
End: 2025-02-09
Payer: MEDICARE

## 2025-02-09 DIAGNOSIS — J18.9 PNEUMONIA OF LEFT LOWER LOBE DUE TO INFECTIOUS ORGANISM: Primary | ICD-10-CM

## 2025-02-09 DIAGNOSIS — J10.1 INFLUENZA A: ICD-10-CM

## 2025-02-09 LAB
ALBUMIN SERPL-MCNC: 3.8 G/DL (ref 3.5–5.2)
ALBUMIN/GLOB SERPL: 1.4 G/DL
ALP SERPL-CCNC: 62 U/L (ref 39–117)
ALT SERPL W P-5'-P-CCNC: 27 U/L (ref 1–33)
ANION GAP SERPL CALCULATED.3IONS-SCNC: 12 MMOL/L (ref 5–15)
AST SERPL-CCNC: 44 U/L (ref 1–32)
B PARAPERT DNA SPEC QL NAA+PROBE: NOT DETECTED
B PERT DNA SPEC QL NAA+PROBE: NOT DETECTED
BASOPHILS # BLD AUTO: 0.01 10*3/MM3 (ref 0–0.2)
BASOPHILS NFR BLD AUTO: 0.4 % (ref 0–1.5)
BILIRUB SERPL-MCNC: 0.4 MG/DL (ref 0–1.2)
BUN SERPL-MCNC: 13 MG/DL (ref 8–23)
BUN/CREAT SERPL: 12.9 (ref 7–25)
C PNEUM DNA NPH QL NAA+NON-PROBE: NOT DETECTED
CALCIUM SPEC-SCNC: 8.9 MG/DL (ref 8.6–10.5)
CHLORIDE SERPL-SCNC: 99 MMOL/L (ref 98–107)
CO2 SERPL-SCNC: 24 MMOL/L (ref 22–29)
CREAT SERPL-MCNC: 1.01 MG/DL (ref 0.57–1)
D-LACTATE SERPL-SCNC: 1 MMOL/L (ref 0.5–2)
DEPRECATED RDW RBC AUTO: 43 FL (ref 37–54)
EGFRCR SERPLBLD CKD-EPI 2021: 59.6 ML/MIN/1.73
EOSINOPHIL # BLD AUTO: 0 10*3/MM3 (ref 0–0.4)
EOSINOPHIL NFR BLD AUTO: 0 % (ref 0.3–6.2)
ERYTHROCYTE [DISTWIDTH] IN BLOOD BY AUTOMATED COUNT: 13.5 % (ref 12.3–15.4)
FLUAV H1 2009 PAND RNA NPH QL NAA+PROBE: DETECTED
FLUBV RNA ISLT QL NAA+PROBE: NOT DETECTED
GEN 5 1HR TROPONIN T REFLEX: 18 NG/L
GLOBULIN UR ELPH-MCNC: 2.8 GM/DL
GLUCOSE SERPL-MCNC: 104 MG/DL (ref 65–99)
HADV DNA SPEC NAA+PROBE: NOT DETECTED
HCOV 229E RNA SPEC QL NAA+PROBE: NOT DETECTED
HCOV HKU1 RNA SPEC QL NAA+PROBE: NOT DETECTED
HCOV NL63 RNA SPEC QL NAA+PROBE: NOT DETECTED
HCOV OC43 RNA SPEC QL NAA+PROBE: NOT DETECTED
HCT VFR BLD AUTO: 42.2 % (ref 34–46.6)
HGB BLD-MCNC: 14 G/DL (ref 12–15.9)
HMPV RNA NPH QL NAA+NON-PROBE: NOT DETECTED
HOLD SPECIMEN: NORMAL
HOLD SPECIMEN: NORMAL
HPIV1 RNA ISLT QL NAA+PROBE: NOT DETECTED
HPIV2 RNA SPEC QL NAA+PROBE: NOT DETECTED
HPIV3 RNA NPH QL NAA+PROBE: NOT DETECTED
HPIV4 P GENE NPH QL NAA+PROBE: NOT DETECTED
IMM GRANULOCYTES # BLD AUTO: 0.01 10*3/MM3 (ref 0–0.05)
IMM GRANULOCYTES NFR BLD AUTO: 0.4 % (ref 0–0.5)
L PNEUMO1 AG UR QL IA: NEGATIVE
LYMPHOCYTES # BLD AUTO: 0.45 10*3/MM3 (ref 0.7–3.1)
LYMPHOCYTES NFR BLD AUTO: 19 % (ref 19.6–45.3)
M PNEUMO IGG SER IA-ACNC: NOT DETECTED
MCH RBC QN AUTO: 29.4 PG (ref 26.6–33)
MCHC RBC AUTO-ENTMCNC: 33.2 G/DL (ref 31.5–35.7)
MCV RBC AUTO: 88.5 FL (ref 79–97)
MONOCYTES # BLD AUTO: 0.35 10*3/MM3 (ref 0.1–0.9)
MONOCYTES NFR BLD AUTO: 14.8 % (ref 5–12)
NEUTROPHILS NFR BLD AUTO: 1.55 10*3/MM3 (ref 1.7–7)
NEUTROPHILS NFR BLD AUTO: 65.4 % (ref 42.7–76)
NRBC BLD AUTO-RTO: 0 /100 WBC (ref 0–0.2)
PLATELET # BLD AUTO: 229 10*3/MM3 (ref 140–450)
PMV BLD AUTO: 10 FL (ref 6–12)
POTASSIUM SERPL-SCNC: 3.5 MMOL/L (ref 3.5–5.2)
PROCALCITONIN SERPL-MCNC: 0.29 NG/ML (ref 0–0.25)
PROT SERPL-MCNC: 6.6 G/DL (ref 6–8.5)
QT INTERVAL: 356 MS
QTC INTERVAL: 426 MS
RBC # BLD AUTO: 4.77 10*6/MM3 (ref 3.77–5.28)
RHINOVIRUS RNA SPEC NAA+PROBE: NOT DETECTED
RSV RNA NPH QL NAA+NON-PROBE: NOT DETECTED
S PNEUM AG SPEC QL LA: NEGATIVE
SARS-COV-2 RNA NPH QL NAA+NON-PROBE: NOT DETECTED
SODIUM SERPL-SCNC: 135 MMOL/L (ref 136–145)
TROPONIN T % DELTA: 6
TROPONIN T NUMERIC DELTA: 1 NG/L
TROPONIN T SERPL HS-MCNC: 17 NG/L
TROPONIN T SERPL HS-MCNC: 17 NG/L
WBC NRBC COR # BLD AUTO: 2.37 10*3/MM3 (ref 3.4–10.8)
WHOLE BLOOD HOLD COAG: NORMAL
WHOLE BLOOD HOLD SPECIMEN: NORMAL

## 2025-02-09 PROCEDURE — 71045 X-RAY EXAM CHEST 1 VIEW: CPT

## 2025-02-09 PROCEDURE — 80053 COMPREHEN METABOLIC PANEL: CPT

## 2025-02-09 PROCEDURE — 93005 ELECTROCARDIOGRAM TRACING: CPT

## 2025-02-09 PROCEDURE — 84145 PROCALCITONIN (PCT): CPT | Performed by: STUDENT IN AN ORGANIZED HEALTH CARE EDUCATION/TRAINING PROGRAM

## 2025-02-09 PROCEDURE — 36415 COLL VENOUS BLD VENIPUNCTURE: CPT | Performed by: STUDENT IN AN ORGANIZED HEALTH CARE EDUCATION/TRAINING PROGRAM

## 2025-02-09 PROCEDURE — 25810000003 LACTATED RINGERS SOLUTION: Performed by: EMERGENCY MEDICINE

## 2025-02-09 PROCEDURE — 84484 ASSAY OF TROPONIN QUANT: CPT

## 2025-02-09 PROCEDURE — 93005 ELECTROCARDIOGRAM TRACING: CPT | Performed by: EMERGENCY MEDICINE

## 2025-02-09 PROCEDURE — 25010000002 CEFTRIAXONE PER 250 MG: Performed by: EMERGENCY MEDICINE

## 2025-02-09 PROCEDURE — 25010000002 ENOXAPARIN PER 10 MG: Performed by: STUDENT IN AN ORGANIZED HEALTH CARE EDUCATION/TRAINING PROGRAM

## 2025-02-09 PROCEDURE — 85025 COMPLETE CBC W/AUTO DIFF WBC: CPT

## 2025-02-09 PROCEDURE — 0202U NFCT DS 22 TRGT SARS-COV-2: CPT | Performed by: STUDENT IN AN ORGANIZED HEALTH CARE EDUCATION/TRAINING PROGRAM

## 2025-02-09 PROCEDURE — 87040 BLOOD CULTURE FOR BACTERIA: CPT | Performed by: EMERGENCY MEDICINE

## 2025-02-09 PROCEDURE — G0378 HOSPITAL OBSERVATION PER HR: HCPCS

## 2025-02-09 PROCEDURE — 99285 EMERGENCY DEPT VISIT HI MDM: CPT

## 2025-02-09 PROCEDURE — 84484 ASSAY OF TROPONIN QUANT: CPT | Performed by: STUDENT IN AN ORGANIZED HEALTH CARE EDUCATION/TRAINING PROGRAM

## 2025-02-09 PROCEDURE — 94640 AIRWAY INHALATION TREATMENT: CPT

## 2025-02-09 PROCEDURE — 83605 ASSAY OF LACTIC ACID: CPT | Performed by: EMERGENCY MEDICINE

## 2025-02-09 PROCEDURE — 94799 UNLISTED PULMONARY SVC/PX: CPT

## 2025-02-09 PROCEDURE — 93010 ELECTROCARDIOGRAM REPORT: CPT | Performed by: INTERNAL MEDICINE

## 2025-02-09 PROCEDURE — 84484 ASSAY OF TROPONIN QUANT: CPT | Performed by: EMERGENCY MEDICINE

## 2025-02-09 PROCEDURE — 87449 NOS EACH ORGANISM AG IA: CPT | Performed by: STUDENT IN AN ORGANIZED HEALTH CARE EDUCATION/TRAINING PROGRAM

## 2025-02-09 RX ORDER — SODIUM CHLORIDE 0.9 % (FLUSH) 0.9 %
10 SYRINGE (ML) INJECTION AS NEEDED
Status: DISCONTINUED | OUTPATIENT
Start: 2025-02-09 | End: 2025-02-12 | Stop reason: HOSPADM

## 2025-02-09 RX ORDER — ASPIRIN 325 MG
325 TABLET ORAL ONCE
Status: DISCONTINUED | OUTPATIENT
Start: 2025-02-09 | End: 2025-02-12 | Stop reason: HOSPADM

## 2025-02-09 RX ORDER — BUDESONIDE AND FORMOTEROL FUMARATE DIHYDRATE 160; 4.5 UG/1; UG/1
2 AEROSOL RESPIRATORY (INHALATION)
Status: DISCONTINUED | OUTPATIENT
Start: 2025-02-09 | End: 2025-02-12 | Stop reason: HOSPADM

## 2025-02-09 RX ORDER — SODIUM CHLORIDE 0.9 % (FLUSH) 0.9 %
10 SYRINGE (ML) INJECTION EVERY 12 HOURS SCHEDULED
Status: DISCONTINUED | OUTPATIENT
Start: 2025-02-09 | End: 2025-02-12 | Stop reason: HOSPADM

## 2025-02-09 RX ORDER — AMOXICILLIN 250 MG
2 CAPSULE ORAL 2 TIMES DAILY PRN
Status: DISCONTINUED | OUTPATIENT
Start: 2025-02-09 | End: 2025-02-12 | Stop reason: HOSPADM

## 2025-02-09 RX ORDER — ENOXAPARIN SODIUM 100 MG/ML
40 INJECTION SUBCUTANEOUS EVERY 24 HOURS
Status: DISCONTINUED | OUTPATIENT
Start: 2025-02-09 | End: 2025-02-12 | Stop reason: HOSPADM

## 2025-02-09 RX ORDER — BISACODYL 5 MG/1
5 TABLET, DELAYED RELEASE ORAL DAILY PRN
Status: DISCONTINUED | OUTPATIENT
Start: 2025-02-09 | End: 2025-02-12 | Stop reason: HOSPADM

## 2025-02-09 RX ORDER — SODIUM CHLORIDE 9 MG/ML
40 INJECTION, SOLUTION INTRAVENOUS AS NEEDED
Status: DISCONTINUED | OUTPATIENT
Start: 2025-02-09 | End: 2025-02-12 | Stop reason: HOSPADM

## 2025-02-09 RX ORDER — DOXYCYCLINE 100 MG/1
100 CAPSULE ORAL EVERY 12 HOURS SCHEDULED
Status: DISCONTINUED | OUTPATIENT
Start: 2025-02-09 | End: 2025-02-10

## 2025-02-09 RX ORDER — ACETAMINOPHEN 500 MG
1000 TABLET ORAL ONCE
Status: COMPLETED | OUTPATIENT
Start: 2025-02-09 | End: 2025-02-09

## 2025-02-09 RX ORDER — ACETAMINOPHEN 325 MG/1
650 TABLET ORAL EVERY 6 HOURS PRN
Status: DISCONTINUED | OUTPATIENT
Start: 2025-02-09 | End: 2025-02-12 | Stop reason: HOSPADM

## 2025-02-09 RX ORDER — OSELTAMIVIR PHOSPHATE 30 MG/1
30 CAPSULE ORAL EVERY 12 HOURS SCHEDULED
Status: DISCONTINUED | OUTPATIENT
Start: 2025-02-09 | End: 2025-02-10

## 2025-02-09 RX ORDER — POLYETHYLENE GLYCOL 3350 17 G/17G
17 POWDER, FOR SOLUTION ORAL DAILY PRN
Status: DISCONTINUED | OUTPATIENT
Start: 2025-02-09 | End: 2025-02-12 | Stop reason: HOSPADM

## 2025-02-09 RX ORDER — IPRATROPIUM BROMIDE AND ALBUTEROL SULFATE 2.5; .5 MG/3ML; MG/3ML
3 SOLUTION RESPIRATORY (INHALATION) EVERY 4 HOURS PRN
Status: DISCONTINUED | OUTPATIENT
Start: 2025-02-09 | End: 2025-02-12 | Stop reason: HOSPADM

## 2025-02-09 RX ORDER — BISACODYL 10 MG
10 SUPPOSITORY, RECTAL RECTAL DAILY PRN
Status: DISCONTINUED | OUTPATIENT
Start: 2025-02-09 | End: 2025-02-12 | Stop reason: HOSPADM

## 2025-02-09 RX ORDER — PANTOPRAZOLE SODIUM 40 MG/10ML
40 INJECTION, POWDER, LYOPHILIZED, FOR SOLUTION INTRAVENOUS
Status: DISCONTINUED | OUTPATIENT
Start: 2025-02-09 | End: 2025-02-12 | Stop reason: HOSPADM

## 2025-02-09 RX ADMIN — Medication 10 ML: at 20:19

## 2025-02-09 RX ADMIN — BUDESONIDE AND FORMOTEROL FUMARATE DIHYDRATE 2 PUFF: 160; 4.5 AEROSOL RESPIRATORY (INHALATION) at 20:57

## 2025-02-09 RX ADMIN — ENOXAPARIN SODIUM 40 MG: 100 INJECTION SUBCUTANEOUS at 12:43

## 2025-02-09 RX ADMIN — ENOXAPARIN SODIUM 40 MG: 100 INJECTION SUBCUTANEOUS at 12:42

## 2025-02-09 RX ADMIN — OSELTAMIVIR PHOSPHATE 30 MG: 30 CAPSULE ORAL at 20:19

## 2025-02-09 RX ADMIN — SODIUM CHLORIDE, POTASSIUM CHLORIDE, SODIUM LACTATE AND CALCIUM CHLORIDE 1000 ML: 600; 310; 30; 20 INJECTION, SOLUTION INTRAVENOUS at 08:55

## 2025-02-09 RX ADMIN — OSELTAMIVIR PHOSPHATE 30 MG: 30 CAPSULE ORAL at 12:45

## 2025-02-09 RX ADMIN — PANTOPRAZOLE SODIUM 40 MG: 40 INJECTION, POWDER, LYOPHILIZED, FOR SOLUTION INTRAVENOUS at 12:44

## 2025-02-09 RX ADMIN — DOXYCYCLINE 100 MG: 100 CAPSULE ORAL at 12:45

## 2025-02-09 RX ADMIN — DOXYCYCLINE 100 MG: 100 CAPSULE ORAL at 20:19

## 2025-02-09 RX ADMIN — CEFTRIAXONE 2000 MG: 2 INJECTION, POWDER, FOR SOLUTION INTRAMUSCULAR; INTRAVENOUS at 08:40

## 2025-02-09 RX ADMIN — Medication 10 ML: at 09:29

## 2025-02-09 RX ADMIN — ACETAMINOPHEN 1000 MG: 500 TABLET, FILM COATED ORAL at 07:52

## 2025-02-09 NOTE — Clinical Note
Level of Care: Telemetry [5]   Diagnosis: Pneumonia [[id number]]   Admitting Physician: PATRICK HYATT [[id number]]   Attending Physician: PATRICK HYATT [[id number]]

## 2025-02-09 NOTE — PROGRESS NOTES
"[facility] Clinical Pharmacy Services: Enoxaparin Consult    Page B Bill has a pharmacy consult to dose prophylactic enoxaparin per Dr Batres's request.     Indication: ppx  Home Anticoagulation: no     Relevant clinical data and objective history reviewed:  69 y.o. female 157.5 cm (62\") 62.6 kg (138 lb)   Body mass index is 25.24 kg/m².   Results from last 7 days   Lab Units 02/09/25  0620   PLATELETS 10*3/mm3 229     Estimated Creatinine Clearance: 44.4 mL/min (A) (by C-G formula based on SCr of 1.01 mg/dL (H)).    Assessment/Plan    Will start patient on Lovenox 40 mg  subcutaneous every 24 hours, adjusted for renal function. Consult order will be discontinued but pharmacy will continue to follow.     Fe Singh [facility]  Clinical Pharmacist    "

## 2025-02-09 NOTE — H&P
Patient Name:  Pebbles Khan  YOB: 1955  MRN:  [id number]  Admit Date:  2/9/2025  Patient Care Team:  Claudio Bejarano MD as PCP - General (Family Medicine)      Subjective   History Present Illness     Chief Complaint   Patient presents with    Chest Pain    Shortness of Breath       Ms. Khan is a 69 y.o. with past medical history of GERD who presents due to cough and dyspnea.  Admits to worsening cough and dyspnea over the past 2 days along with intermittent fever with.  States her  was recently diagnosed with a viral illness and she feels like she may have gotten it.  Denies chest pain, hemoptysis, vomiting, diarrhea.  Due to worsening dyspnea she presented to [facility] for evaluation    In the ER, vitals 100.1 F, heart rate 105 bpm, RR 20, 91% on 2 L nasal cannula.  Labs notable for HS troponin 17 > 18, sodium 135, creatinine 1.01, glucose 104, lactic 1.0, Pro-Vini 0.29, WBC 2.37.  Chest x-ray showed opacity at the left base suggesting pneumonia.  EKG NSR without gross ischemia.      History of Present Illness    Review of Systems   12 point ROS reviewed and negative except as mentioned above      Personal History     Past Medical History:   Diagnosis Date    GERD (gastroesophageal reflux disease)     Hyperlipidemia     Migraines      Past Surgical History:   Procedure Laterality Date    CHOLECYSTECTOMY      COLON RESECTION      COLONOSCOPY      COLONOSCOPY N/A 2/23/2023    Procedure: COLONOSCOPY FOR SCREENING- 3 YEAR SCREENING;  Surgeon: Mikael Steiner MD;  Location: [facility] [place] MAIN OR;  Service: Gastroenterology;  Laterality: N/A;  Diverticulosis    ENDOSCOPY       Family History   Problem Relation Age of Onset    Lung cancer Father      Social History     Tobacco Use    Smoking status: Never    Smokeless tobacco: Never   Vaping Use    Vaping status: Never Used   Substance Use Topics    Alcohol use: No    Drug use: No     No current facility-administered medications on file  prior to encounter.     Current Outpatient Medications on File Prior to Encounter   Medication Sig Dispense Refill    lansoprazole (PREVACID) 15 MG capsule Take 1 capsule by mouth Daily.      melatonin 5 MG tablet tablet Take 1 tablet by mouth.      multivitamin with minerals tablet tablet Take 1 tablet by mouth Daily.      polyethyl glycol-propyl glycol (SYSTANE) 0.4-0.3 % solution ophthalmic solution 2 drops Every Morning.      promethazine (PHENERGAN) 25 MG suppository Insert 1 suppository into the rectum Every 6 (Six) Hours As Needed for Nausea or Vomiting. 7 suppository 0    Saccharomyces boulardii (FLORASTOR PO) Take  by mouth 2 (Two) Times a Day.      simvastatin (ZOCOR) 40 MG tablet TAKE 1 TABLET BY MOUTH EVERY NIGHT AT BEDTIME 90 tablet 3    SUMAtriptan (IMITREX) 100 MG tablet Take 1 tablet by mouth Every 2 (Two) Hours As Needed for Migraine. Take one tablet at onset of headache. May repeat dose one time in 2 hours if headache not relieved. 9 tablet 4    vitamin B-12 (CYANOCOBALAMIN) 1000 MCG tablet TAKE 1 TABLET BY MOUTH EVERY  tablet 3     Allergies   Allergen Reactions    Ibuprofen Itching       Objective    Objective     Vital Signs  Temp:  [100.1 °F (37.8 °C)] 100.1 °F (37.8 °C)  Heart Rate:  [] 78  Resp:  [20] 20  BP: (102-116)/(59-81) 108/81  SpO2:  [90 %-94 %] 92 %  on  Flow (L/min) (Oxygen Therapy):  [2] 2;   Device (Oxygen Therapy): nasal cannula  Body mass index is 25.24 kg/m².    Physical Exam  Constitutional:       General: She is not in acute distress.     Appearance: Normal appearance. She is not toxic-appearing.   HENT:      Head: Normocephalic and atraumatic.      Nose: Nose normal. No congestion.      Mouth/Throat:      Pharynx: Oropharynx is clear. No oropharyngeal exudate.   Eyes:      General: No scleral icterus.  Cardiovascular:      Rate and Rhythm: Normal rate and regular rhythm.      Heart sounds: No murmur heard.     No friction rub. No gallop.   Pulmonary:       Effort: No respiratory distress.      Breath sounds: Rales present. No wheezing.      Comments: Currently on 2 L nasal cannula  Abdominal:      General: There is no distension.      Tenderness: There is no abdominal tenderness. There is no guarding.   Musculoskeletal:         General: No swelling or deformity.      Cervical back: Normal range of motion. No rigidity.      Right lower leg: No edema.      Left lower leg: No edema.   Skin:     Coloration: Skin is not jaundiced.      Findings: No bruising or lesion.   Neurological:      General: No focal deficit present.      Mental Status: She is alert and oriented to person, place, and time.      Motor: No weakness.         Results Review:  I reviewed the patient's new clinical results.  I reviewed the patient's new imaging results and agree with the interpretation.  I reviewed the patient's other test results and agree with the interpretation  I personally viewed and interpreted the patient's EKG/Telemetry data  Discussed with ED provider.    Lab Results (last 24 hours)       Procedure Component Value Units Date/Time    CBC & Differential [[id number]]  (Abnormal) Collected: 02/09/25 0620    Specimen: Blood Updated: 02/09/25 0636    Narrative:      The following orders were created for panel order CBC & Differential.  Procedure                               Abnormality         Status                     ---------                               -----------         ------                     CBC Auto Differential[[id number]]        Abnormal            Final result                 Please view results for these tests on the individual orders.    Comprehensive Metabolic Panel [[id number]]  (Abnormal) Collected: 02/09/25 0620    Specimen: Blood Updated: 02/09/25 0711     Glucose 104 mg/dL      BUN 13 mg/dL      Creatinine 1.01 mg/dL      Sodium 135 mmol/L      Potassium 3.5 mmol/L      Chloride 99 mmol/L      CO2 24.0 mmol/L      Calcium 8.9 mg/dL      Total Protein 6.6 g/dL       Albumin 3.8 g/dL      ALT (SGPT) 27 U/L      AST (SGOT) 44 U/L      Alkaline Phosphatase 62 U/L      Total Bilirubin 0.4 mg/dL      Globulin 2.8 gm/dL      A/G Ratio 1.4 g/dL      BUN/Creatinine Ratio 12.9     Anion Gap 12.0 mmol/L      eGFR 59.6 mL/min/1.73     Narrative:      GFR Categories in Chronic Kidney Disease (CKD)      GFR Category          GFR (mL/min/1.73)    Interpretation  G1                     90 or greater         Normal or high (1)  G2                      60-89                Mild decrease (1)  G3a                   45-59                Mild to moderate decrease  G3b                   30-44                Moderate to severe decrease  G4                    15-29                Severe decrease  G5                    14 or less           Kidney failure          (1)In the absence of evidence of kidney disease, neither GFR category G1 or G2 fulfill the criteria for CKD.    eGFR calculation 2021 CKD-EPI creatinine equation, which does not include race as a factor    High Sensitivity Troponin T [[id number]]  (Abnormal) Collected: 02/09/25 0620    Specimen: Blood Updated: 02/09/25 0711     HS Troponin T 17 ng/L     Narrative:      High Sensitive Troponin T Reference Range:  <14.0 ng/L- Negative Female for AMI  <22.0 ng/L- Negative Male for AMI  >=14 - Abnormal Female indicating possible myocardial injury.  >=22 - Abnormal Male indicating possible myocardial injury.   Clinicians would have to utilize clinical acumen, EKG, Troponin, and serial changes to determine if it is an Acute Myocardial Infarction or myocardial injury due to an underlying chronic condition.         CBC Auto Differential [[id number]]  (Abnormal) Collected: 02/09/25 0620    Specimen: Blood Updated: 02/09/25 0636     WBC 2.37 10*3/mm3      RBC 4.77 10*6/mm3      Hemoglobin 14.0 g/dL      Hematocrit 42.2 %      MCV 88.5 fL      MCH 29.4 pg      MCHC 33.2 g/dL      RDW 13.5 %      RDW-SD 43.0 fl      MPV 10.0 fL      Platelets 229 10*3/mm3   "    Neutrophil % 65.4 %      Lymphocyte % 19.0 %      Monocyte % 14.8 %      Eosinophil % 0.0 %      Basophil % 0.4 %      Immature Grans % 0.4 %      Neutrophils, Absolute 1.55 10*3/mm3      Lymphocytes, Absolute 0.45 10*3/mm3      Monocytes, Absolute 0.35 10*3/mm3      Eosinophils, Absolute 0.00 10*3/mm3      Basophils, Absolute 0.01 10*3/mm3      Immature Grans, Absolute 0.01 10*3/mm3      nRBC 0.0 /100 WBC     Procalcitonin [[id number]]  (Abnormal) Collected: 02/09/25 0620    Specimen: Blood Updated: 02/09/25 0740     Procalcitonin 0.29 ng/mL     Narrative:      As a Marker for Sepsis (Non-Neonates):    1. <0.5 ng/mL represents a low risk of severe sepsis and/or septic shock.  2. >2 ng/mL represents a high risk of severe sepsis and/or septic shock.    As a Marker for Lower Respiratory Tract Infections that require antibiotic therapy:    PCT on Admission    Antibiotic Therapy       6-12 Hrs later    >0.5                Strongly Recommended  >0.25 - <0.5        Recommended   0.1 - 0.25          Discouraged              Remeasure/reassess PCT  <0.1                Strongly Discouraged     Remeasure/reassess PCT    As 28 day mortality risk marker: \"Change in Procalcitonin Result\" (>80% or <=80%) if Day 0 (or Day 1) and Day 4 values are available. Refer to [email or web address]    Change in PCT <=80%  A decrease of PCT levels below or equal to 80% defines a positive change in PCT test result representing a higher risk for 28-day all-cause mortality of patients diagnosed with severe sepsis for septic shock.    Change in PCT >80%  A decrease of PCT levels of more than 80% defines a negative change in PCT result representing a lower risk for 28-day all-cause mortality of patients diagnosed with severe sepsis or septic shock.       Respiratory Panel PCR w/COVID-19(SARS-CoV-2) DESTINI/DAXA/MARTHA/PAD/COR/RICKY In-House, NP Swab in UTM/VTM, 2 HR TAT - Swab, Nasopharynx [[id number]] Collected: 02/09/25 0743    Specimen: " Swab from Nasopharynx Updated: 02/09/25 0747    High Sensitivity Troponin T 1Hr [[id number]]  (Abnormal) Collected: 02/09/25 0759    Specimen: Blood Updated: 02/09/25 0826     HS Troponin T 18 ng/L      Troponin T Numeric Delta 1 ng/L      Troponin T % Delta 6    Narrative:      High Sensitive Troponin T Reference Range:  <14.0 ng/L- Negative Female for AMI  <22.0 ng/L- Negative Male for AMI  >=14 - Abnormal Female indicating possible myocardial injury.  >=22 - Abnormal Male indicating possible myocardial injury.   Clinicians would have to utilize clinical acumen, EKG, Troponin, and serial changes to determine if it is an Acute Myocardial Infarction or myocardial injury due to an underlying chronic condition.         Lactic Acid, Plasma [[id number]]  (Normal) Collected: 02/09/25 0812    Specimen: Blood from Arm, Right Updated: 02/09/25 0839     Lactate 1.0 mmol/L     Blood Culture - Blood, Arm, Right [[id number]] Collected: 02/09/25 0812    Specimen: Blood from Arm, Right Updated: 02/09/25 0817    Blood Culture - Blood, Arm, Left [[id number]] Collected: 02/09/25 0835    Specimen: Blood from Arm, Left Updated: 02/09/25 0839            Imaging Results (Last 24 Hours)       Procedure Component Value Units Date/Time    XR Chest 1 View [[id number]] Collected: 02/09/25 0652     Updated: 02/09/25 0657    Narrative:      XR CHEST 1 VW-     HISTORY: Female who is 69 years-old, chest pain     TECHNIQUE: Frontal view of the chest     COMPARISON: None available     FINDINGS: The heart size is borderline. Pulmonary vasculature is  unremarkable. Patchy and consolidative opacity at the left base suggest  pneumonia, follow-up recommended to characterize resolution and to  exclude any possibility of an underlying lesion. No large pleural  effusion, or pneumothorax. At least moderate hiatal hernia is suggested.  No acute osseous process.       Impression:      As described.     This report was finalized on 2/9/2025 6:53 AM by Dr. Cross  LEBRON Conklin M.D on Workstation: [id number]               Results for orders placed during the hospital encounter of 07/29/22    Adult Transthoracic Echo Complete W/ Cont if Necessary Per Protocol    Interpretation Summary  · Left ventricular ejection fraction appears to be 56 - 60%. Left ventricular systolic function is normal. Normal left ventricular cavity size noted. Left ventricular wall thickness is consistent with mild septal asymmetric hypertrophy. All left ventricular wall segments contract normally. Left ventricular diastolic function was indeterminate.  · There is mild calcification of the aortic valve. Mild to moderate aortic valve regurgitation is present. No hemodynamically significant aortic valve stenosis is present.  · There is mild mitral valve prolapse of the anterior mitral leaflet. Mild mitral valve regurgitation is present.      ECG 12 Lead ED Triage Standing Order; Chest Pain   Preliminary Result   HEART RATE=86  bpm   RR Jenvqktk=812  ms   [place] Mgxofvcv=790  ms   P Horizontal Axis=7  deg   P Front Axis=13  deg   QRSD Interval=95  ms   QT Grijoekn=370  ms   NPeZ=917  ms   QRS Axis=-59  deg   T Wave Axis=24  deg   - ABNORMAL ECG -   Sinus rhythm   Left anterior fascicular block   Abnormal R-wave progression, early transition   Probable left ventricular hypertrophy   Date and Time of Study:2025-02-09 06:08:47           Assessment/Plan   There are no hospital problems to display for this patient.    #Pneumonia  #Hypoxia    -Chest x-ray shows pneumonia at the left base    -Patient placed on 2 L nasal cannula, wean as tolerated    -Rocephin 2 g IV daily    -Doxycycline 100 mg p.o. twice daily x 5 days    -Strep, Legionella, sputum, blood cultures    -RVP    -Tylenol as needed fever    -DuoNeb as needed    -Symbicort twice daily    - HS troponin 17 > 18, delta 1, trend, suspect 2/2 demand, denies chest pain    - wbc 2.37, trend    - procal 0.29    #Influenza    -RVP positive for influenza A H1     -Tamiflu, renally dosed    #GERD    -PPI    VTE Prophylaxis - Pharmacy to dose Lovenox.  Code Status - Full code.       Britney Batres DO  [facility]  02/09/25  08:58 EST

## 2025-02-09 NOTE — ED PROVIDER NOTES
EMERGENCY DEPARTMENT ENCOUNTER    Room Number:  26/26  PCP: Claudio Bejarano MD  Historian: Patient      HPI:  Chief Complaint: Cough congestion chest pain  A complete HPI/ROS/PMH/PSH/SH/FH are unobtainable due to: None  Context: Page B Bill is a 69 y.o. female who presents to the ED c/o cough and congestion.  Patient states symptoms started several days ago.  Her  had similar symptoms.  He was diagnosed with a viral illness.  Patient states she has had worsening symptoms.  Is had vomiting and diarrhea.  Has had some chest pain that went into both arms.  Patient states she has had cough.  Patient has had shortness of breath.  Has had fever.            PAST MEDICAL HISTORY  Active Ambulatory Problems     Diagnosis Date Noted    Hyperlipidemia 04/17/2017    Migraine 04/17/2017    Gastroesophageal reflux disease with esophagitis 04/17/2017    Hiatal hernia 04/17/2017    Chronic midline low back pain without sciatica 06/04/2018    Elevated blood pressure reading 06/17/2022    Medicare annual wellness visit, subsequent 06/17/2022    Hypovitaminosis D 06/17/2022    Nonrheumatic aortic valve insufficiency 07/01/2024    PAC (premature atrial contraction) 07/01/2024     Resolved Ambulatory Problems     Diagnosis Date Noted    No Resolved Ambulatory Problems     Past Medical History:   Diagnosis Date    GERD (gastroesophageal reflux disease)     Migraines          PAST SURGICAL HISTORY  Past Surgical History:   Procedure Laterality Date    CHOLECYSTECTOMY      COLON RESECTION      COLONOSCOPY      COLONOSCOPY N/A 2/23/2023    Procedure: COLONOSCOPY FOR SCREENING- 3 YEAR SCREENING;  Surgeon: Mikeal Steiner MD;  Location: [facility] MAIN OR;  Service: Gastroenterology;  Laterality: N/A;  Diverticulosis    ENDOSCOPY           FAMILY HISTORY  Family History   Problem Relation Age of Onset    Lung cancer Father          SOCIAL HISTORY  Social History     Socioeconomic History    Marital status:     Number of  children: 2   Tobacco Use    Smoking status: Never    Smokeless tobacco: Never   Vaping Use    Vaping status: Never Used   Substance and Sexual Activity    Alcohol use: No    Drug use: No    Sexual activity: Yes     Partners: Male         ALLERGIES  Ibuprofen        REVIEW OF SYSTEMS  Review of Systems   Cough and congestion      PHYSICAL EXAM  ED Triage Vitals   Temp Heart Rate Resp BP SpO2   02/09/25 0600 02/09/25 0600 02/09/25 0600 02/09/25 0613 02/09/25 0600   100.1 °F (37.8 °C) 105 20 116/71 91 %      Temp src Heart Rate Source Patient Position BP Location FiO2 (%)   -- -- -- -- --              Physical Exam      GENERAL: no acute distress  HENT: nares patent  EYES: no scleral icterus  CV: regular rhythm, normal rate.    RESPIRATORY: normal effort.  Rales left base  ABDOMEN: soft  MUSCULOSKELETAL: no deformity  NEURO: alert, moves all extremities, follows commands  PSYCH:  calm, cooperative  SKIN: warm, dry    Vital signs and nursing notes reviewed.          LAB RESULTS  Recent Results (from the past 24 hours)   ECG 12 Lead ED Triage Standing Order; Chest Pain    Collection Time: 02/09/25  6:08 AM   Result Value Ref Range    QT Interval 356 ms    QTC Interval 426 ms   Comprehensive Metabolic Panel    Collection Time: 02/09/25  6:20 AM    Specimen: Blood   Result Value Ref Range    Glucose 104 (H) 65 - 99 mg/dL    BUN 13 8 - 23 mg/dL    Creatinine 1.01 (H) 0.57 - 1.00 mg/dL    Sodium 135 (L) 136 - 145 mmol/L    Potassium 3.5 3.5 - 5.2 mmol/L    Chloride 99 98 - 107 mmol/L    CO2 24.0 22.0 - 29.0 mmol/L    Calcium 8.9 8.6 - 10.5 mg/dL    Total Protein 6.6 6.0 - 8.5 g/dL    Albumin 3.8 3.5 - 5.2 g/dL    ALT (SGPT) 27 1 - 33 U/L    AST (SGOT) 44 (H) 1 - 32 U/L    Alkaline Phosphatase 62 39 - 117 U/L    Total Bilirubin 0.4 0.0 - 1.2 mg/dL    Globulin 2.8 gm/dL    A/G Ratio 1.4 g/dL    BUN/Creatinine Ratio 12.9 7.0 - 25.0    Anion Gap 12.0 5.0 - 15.0 mmol/L    eGFR 59.6 (L) >60.0 mL/min/1.73   High Sensitivity  Troponin T    Collection Time: 02/09/25  6:20 AM    Specimen: Blood   Result Value Ref Range    HS Troponin T 17 (H) <14 ng/L   Green Top (Gel)    Collection Time: 02/09/25  6:20 AM   Result Value Ref Range    Extra Tube Hold for add-ons.    Lavender Top    Collection Time: 02/09/25  6:20 AM   Result Value Ref Range    Extra Tube hold for add-on    Gold Top - SST    Collection Time: 02/09/25  6:20 AM   Result Value Ref Range    Extra Tube Hold for add-ons.    Light Blue Top    Collection Time: 02/09/25  6:20 AM   Result Value Ref Range    Extra Tube Hold for add-ons.    CBC Auto Differential    Collection Time: 02/09/25  6:20 AM    Specimen: Blood   Result Value Ref Range    WBC 2.37 (L) 3.40 - 10.80 10*3/mm3    RBC 4.77 3.77 - 5.28 10*6/mm3    Hemoglobin 14.0 12.0 - 15.9 g/dL    Hematocrit 42.2 34.0 - 46.6 %    MCV 88.5 79.0 - 97.0 fL    MCH 29.4 26.6 - 33.0 pg    MCHC 33.2 31.5 - 35.7 g/dL    RDW 13.5 12.3 - 15.4 %    RDW-SD 43.0 37.0 - 54.0 fl    MPV 10.0 6.0 - 12.0 fL    Platelets 229 140 - 450 10*3/mm3    Neutrophil % 65.4 42.7 - 76.0 %    Lymphocyte % 19.0 (L) 19.6 - 45.3 %    Monocyte % 14.8 (H) 5.0 - 12.0 %    Eosinophil % 0.0 (L) 0.3 - 6.2 %    Basophil % 0.4 0.0 - 1.5 %    Immature Grans % 0.4 0.0 - 0.5 %    Neutrophils, Absolute 1.55 (L) 1.70 - 7.00 10*3/mm3    Lymphocytes, Absolute 0.45 (L) 0.70 - 3.10 10*3/mm3    Monocytes, Absolute 0.35 0.10 - 0.90 10*3/mm3    Eosinophils, Absolute 0.00 0.00 - 0.40 10*3/mm3    Basophils, Absolute 0.01 0.00 - 0.20 10*3/mm3    Immature Grans, Absolute 0.01 0.00 - 0.05 10*3/mm3    nRBC 0.0 0.0 - 0.2 /100 WBC   Procalcitonin    Collection Time: 02/09/25  6:20 AM    Specimen: Blood   Result Value Ref Range    Procalcitonin 0.29 (H) 0.00 - 0.25 ng/mL   Respiratory Panel PCR w/COVID-19(SARS-CoV-2) DESTINI/DAXA/MARTHA/PAD/COR/RICKY In-House, NP Swab in [facility], 2 HR TAT - Swab, Nasopharynx    Collection Time: 02/09/25  7:43 AM    Specimen: Nasopharynx; Swab   Result Value Ref Range     ADENOVIRUS, PCR Not Detected Not Detected    Coronavirus 229E Not Detected Not Detected    Coronavirus HKU1 Not Detected Not Detected    Coronavirus NL63 Not Detected Not Detected    Coronavirus OC43 Not Detected Not Detected    COVID19 Not Detected Not Detected - Ref. Range    Human Metapneumovirus Not Detected Not Detected    Human Rhinovirus/Enterovirus Not Detected Not Detected    Influenza A H1 2009 PCR Detected (A) Not Detected    Influenza B PCR Not Detected Not Detected    Parainfluenza Virus 1 Not Detected Not Detected    Parainfluenza Virus 2 Not Detected Not Detected    Parainfluenza Virus 3 Not Detected Not Detected    Parainfluenza Virus 4 Not Detected Not Detected    RSV, PCR Not Detected Not Detected    Bordetella pertussis pcr Not Detected Not Detected    Bordetella parapertussis PCR Not Detected Not Detected    Chlamydophila pneumoniae PCR Not Detected Not Detected    Mycoplasma pneumo by PCR Not Detected Not Detected   High Sensitivity Troponin T 1Hr    Collection Time: 02/09/25  7:59 AM    Specimen: Blood   Result Value Ref Range    HS Troponin T 18 (H) <14 ng/L    Troponin T Numeric Delta 1 ng/L    Troponin T % Delta 6 Abnormal if >/= 20%   Lactic Acid, Plasma    Collection Time: 02/09/25  8:12 AM    Specimen: Arm, Right; Blood   Result Value Ref Range    Lactate 1.0 0.5 - 2.0 mmol/L       Ordered the above labs and reviewed the results.        RADIOLOGY  XR Chest 1 View    Result Date: 2/9/2025  XR CHEST 1 VW-  HISTORY: Female who is 69 years-old, chest pain  TECHNIQUE: Frontal view of the chest  COMPARISON: None available  FINDINGS: The heart size is borderline. Pulmonary vasculature is unremarkable. Patchy and consolidative opacity at the left base suggest pneumonia, follow-up recommended to characterize resolution and to exclude any possibility of an underlying lesion. No large pleural effusion, or pneumothorax. At least moderate hiatal hernia is suggested. No acute osseous process.      As  described.  This report was finalized on 2/9/2025 6:53 AM by Dr. Tay Conklin M.D on Workstation[email or web address]       Ordered the above noted radiological studies.  Chest x-ray independent interpreted by me and shows left lower lobe pneumonia          PROCEDURES  Procedures    EKG          EKG time: 608  Rhythm/Rate: Normal sinus rhythm 86  P waves and IL: Normal P waves  QRS, axis: Normal QRS  ST and T waves: Nonspecific ST-T wave    Interpreted Contemporaneously by me, independently viewed  No prior      MEDICATIONS GIVEN IN ER  Medications   sodium chloride 0.9 % flush 10 mL (has no administration in time range)   aspirin tablet 325 mg (0 mg Oral Hold 2/9/25 5431)   pantoprazole (PROTONIX) injection 40 mg (has no administration in time range)   cefTRIAXone (ROCEPHIN) 2,000 mg in sodium chloride 0.9 % 100 mL MBP (has no administration in time range)   doxycycline (MONODOX) capsule 100 mg (has no administration in time range)   acetaminophen (TYLENOL) tablet 650 mg (has no administration in time range)   ipratropium-albuterol (DUO-NEB) nebulizer solution 3 mL (has no administration in time range)   sodium chloride 0.9 % flush 10 mL (10 mL Intravenous Given 2/9/25 7196)   sodium chloride 0.9 % flush 10 mL (has no administration in time range)   sodium chloride 0.9 % infusion 40 mL (has no administration in time range)   Pharmacy to Dose enoxaparin (LOVENOX) (has no administration in time range)   Potassium Replacement - Follow Nurse / BPA Driven Protocol (has no administration in time range)   Magnesium Standard Dose Replacement - Follow Nurse / BPA Driven Protocol (has no administration in time range)   Phosphorus Replacement - Follow Nurse / BPA Driven Protocol (has no administration in time range)   Calcium Replacement - Follow Nurse / BPA Driven Protocol (has no administration in time range)   sennosides-docusate (PERICOLACE) 8.6-50 MG per tablet 2 tablet (has no administration in time range)     And    polyethylene glycol (MIRALAX) packet 17 g (has no administration in time range)     And   bisacodyl (DULCOLAX) EC tablet 5 mg (has no administration in time range)     And   bisacodyl (DULCOLAX) suppository 10 mg (has no administration in time range)   oseltamivir (TAMIFLU) capsule 30 mg (has no administration in time range)   budesonide-formoterol (SYMBICORT) 160-4.5 MCG/ACT inhaler 2 puff (has no administration in time range)   acetaminophen (TYLENOL) tablet 1,000 mg (1,000 mg Oral Given 2/9/25 0752)   cefTRIAXone (ROCEPHIN) 2,000 mg in sodium chloride 0.9 % 100 mL MBP (0 mg Intravenous Stopped 2/9/25 0929)   lactated ringers bolus 1,000 mL (0 mL Intravenous Stopped 2/9/25 0929)                   MEDICAL DECISION MAKING, PROGRESS, and CONSULTS    All labs have been independently reviewed by me.  All radiology studies have been reviewed by me and I have also reviewed the radiology report.   EKG's independently viewed and interpreted by me.  Discussion below represents my analysis of pertinent findings related to patient's condition, differential diagnosis, treatment plan and final disposition.      Additional sources:  - Discussed/ obtained information from independent historians: None    - External (non-ED) record review: Epic reviewed patient seen by primary provider 7/1/2024 for hyperlipidemia    - Chronic or social conditions impacting care: None    - Shared decision making: None      Orders placed during this visit:  Orders Placed This Encounter   Procedures    Respiratory Panel PCR w/COVID-19(SARS-CoV-2) DESTINI/DAXA/MARTHA/PAD/COR/RICKY In-House, NP Swab in UTM/VTM, 2 HR TAT - Swab, Nasopharynx    Blood Culture - Blood,    Blood Culture - Blood,    S. Pneumo Ag Urine or CSF - Urine, Urine, Clean Catch    Legionella Antigen, Urine - Urine, Urine, Clean Catch    Respiratory Culture - Sputum, Cough    XR Chest 1 View    [place] Draw    Comprehensive Metabolic Panel    High Sensitivity Troponin T    CBC Auto  Differential    Procalcitonin    High Sensitivity Troponin T 1Hr    Lactic Acid, Plasma    CBC (No Diff)    Basic Metabolic Panel    High Sensitivity Troponin T    Diet: Cardiac, Diabetic; Healthy Heart (2-3 Na+); Consistent Carbohydrate; Fluid Consistency: Thin (IDDSI 0)    Undress & Gown    Continuous Pulse Oximetry    Vital Signs    Notify Provider (With Default Parameters)    Activity - Ad Fiorella    Up in Chair    Intake & Output    Daily Weights    Positioning Instruction    Oral Care    Saline Lock & Maintain IV Access    Tobacco Cessation Education    Code Status and Medical Interventions: CPR (Attempt to Resuscitate); Full Support    LHA (on-call MD unless specified) Details    Oxygen Therapy- Nasal Cannula; Titrate 1-6 LPM Per SpO2; 90 - 95%    ECG 12 Lead ED Triage Standing Order; Chest Pain    ECG 12 Lead ED Triage Standing Order; Chest Pain    Insert Peripheral IV    Insert Peripheral IV    Initiate Observation Status    Initiate Observation Status    CBC & Differential    Green Top (Gel)    Lavender Top    Gold Top - SST    Light Blue Top         Additional orders considered but not ordered:  None        Differential diagnosis includes but is not limited to:    Pneumonia versus influenza versus CHF      Independent interpretation of labs, radiology studies, and discussions with consultants:  ED Course as of 02/09/25 0945   Sun Feb 09, 2025   0924 09:24 EST  Presents for cough congestion.  Does appear to have pneumonia on chest x-ray.  Patient is influenza positive.  Patient's white blood cell count is low.  Procalcitonin is mildly elevated.  Patient has been given Rocephin discussed with Dr. Batres and will be admitted for further evaluation and management [SL]      ED Course User Index  [SL] Desmond Paredes MD                 DIAGNOSIS  Final diagnoses:   Pneumonia of left lower lobe due to infectious organism   Influenza A         DISPOSITION  admit            Latest Documented Vital Signs:  As of  09:45 EST  BP- 108/81 HR- 78 Temp- 100.1 °F (37.8 °C) O2 sat- 92%              --    Please note that portions of this were completed with a voice recognition program.       Note Disclaimer: At [facility], we believe that sharing information builds trust and better relationships. You are receiving this note because you are receiving care at [facility] or recently visited. It is possible you will see health information before a provider has talked with you about it. This kind of information can be easy to misunderstand. To help you fully understand what it means for your health, we urge you to discuss this note with your provider.            Desmond Paredes MD  02/09/25 0993

## 2025-02-09 NOTE — ED NOTES
Nursing report ED to floor  Page B Bill  69 y.o.  female    HPI :  HPI  Stated Reason for Visit: Pt to ED from home with complaints of SOA, chest pain, nausea, cough x6 days.  History Obtained From: patient    Chief Complaint  Chief Complaint   Patient presents with    Chest Pain    Shortness of Breath       Admitting doctor:   Britney Batres DO    Admitting diagnosis:   The primary encounter diagnosis was Pneumonia of left lower lobe due to infectious organism. A diagnosis of Influenza A was also pertinent to this visit.    Code status:   Current Code Status       Date Active Code Status Order ID Comments User Context       2/9/2025 0918 CPR (Attempt to Resuscitate) [id number]  Britney Batres DO ED        Question Answer    Code Status (Patient has no pulse and is not breathing) CPR (Attempt to Resuscitate)    Medical Interventions (Patient has pulse or is breathing) Full Support                    Allergies:   Ibuprofen    Isolation:   Droplet    Intake and Output  No intake or output data in the 24 hours ending 02/09/25 1239    Weight:       02/09/25  0600   Weight: 62.6 kg (138 lb)       Most recent vitals:   Vitals:    02/09/25 1235 02/09/25 1237 02/09/25 1238 02/09/25 1239   BP:       Pulse: 93 78 76 76   Resp:       Temp:       SpO2: 90% 92% 90% 92%   Weight:       Height:           Active LDAs/IV Access:   Lines, Drains & Airways       Active LDAs       Name Placement date Placement time Site Days    Peripheral IV 02/09/25 0827 Anterior;Left Forearm 02/09/25  0827  Forearm  less than 1                    Labs (abnormal labs have a star):   Labs Reviewed   RESPIRATORY PANEL PCR W/ COVID-19 (SARS-COV-2), NP SWAB IN UTM/VTP, 2 HR TAT - Abnormal; Notable for the following components:       Result Value    Influenza A H1 2009 PCR Detected (*)     All other components within normal limits    Narrative:     In the setting of a positive respiratory panel with a viral infection PLUS a negative procalcitonin  without other underlying concern for bacterial infection, consider observing off antibiotics or discontinuation of antibiotics and continue supportive care. If the respiratory panel is positive for atypical bacterial infection (Bordetella pertussis, Chlamydophila pneumoniae, or Mycoplasma pneumoniae), consider antibiotic de-escalation to target atypical bacterial infection.   COMPREHENSIVE METABOLIC PANEL - Abnormal; Notable for the following components:    Glucose 104 (*)     Creatinine 1.01 (*)     Sodium 135 (*)     AST (SGOT) 44 (*)     eGFR 59.6 (*)     All other components within normal limits    Narrative:     GFR Categories in Chronic Kidney Disease (CKD)      GFR Category          GFR (mL/min/1.73)    Interpretation  G1                     90 or greater         Normal or high (1)  G2                      60-89                Mild decrease (1)  G3a                   45-59                Mild to moderate decrease  G3b                   30-44                Moderate to severe decrease  G4                    15-29                Severe decrease  G5                    14 or less           Kidney failure          (1)In the absence of evidence of kidney disease, neither GFR category G1 or G2 fulfill the criteria for CKD.    eGFR calculation 2021 CKD-EPI creatinine equation, which does not include race as a factor   TROPONIN - Abnormal; Notable for the following components:    HS Troponin T 17 (*)     All other components within normal limits    Narrative:     High Sensitive Troponin T Reference Range:  <14.0 ng/L- Negative Female for AMI  <22.0 ng/L- Negative Male for AMI  >=14 - Abnormal Female indicating possible myocardial injury.  >=22 - Abnormal Male indicating possible myocardial injury.   Clinicians would have to utilize clinical acumen, EKG, Troponin, and serial changes to determine if it is an Acute Myocardial Infarction or myocardial injury due to an underlying chronic condition.        CBC WITH AUTO  "DIFFERENTIAL - Abnormal; Notable for the following components:    WBC 2.37 (*)     Lymphocyte % 19.0 (*)     Monocyte % 14.8 (*)     Eosinophil % 0.0 (*)     Neutrophils, Absolute 1.55 (*)     Lymphocytes, Absolute 0.45 (*)     All other components within normal limits   PROCALCITONIN - Abnormal; Notable for the following components:    Procalcitonin 0.29 (*)     All other components within normal limits    Narrative:     As a Marker for Sepsis (Non-Neonates):    1. <0.5 ng/mL represents a low risk of severe sepsis and/or septic shock.  2. >2 ng/mL represents a high risk of severe sepsis and/or septic shock.    As a Marker for Lower Respiratory Tract Infections that require antibiotic therapy:    PCT on Admission    Antibiotic Therapy       6-12 Hrs later    >0.5                Strongly Recommended  >0.25 - <0.5        Recommended   0.1 - 0.25          Discouraged              Remeasure/reassess PCT  <0.1                Strongly Discouraged     Remeasure/reassess PCT    As 28 day mortality risk marker: \"Change in Procalcitonin Result\" (>80% or <=80%) if Day 0 (or Day 1) and Day 4 values are available. Refer to http://www.[email or web address]-pct-calculator.[email or web address]    Change in PCT <=80%  A decrease of PCT levels below or equal to 80% defines a positive change in PCT test result representing a higher risk for 28-day all-cause mortality of patients diagnosed with severe sepsis for septic shock.    Change in PCT >80%  A decrease of PCT levels of more than 80% defines a negative change in PCT result representing a lower risk for 28-day all-cause mortality of patients diagnosed with severe sepsis or septic shock.      HIGH SENSITIVITIY TROPONIN T 1HR - Abnormal; Notable for the following components:    HS Troponin T 18 (*)     All other components within normal limits    Narrative:     High Sensitive Troponin T Reference Range:  <14.0 ng/L- Negative Female for AMI  <22.0 ng/L- Negative Male for AMI  >=14 - Abnormal Female indicating " possible myocardial injury.  >=22 - Abnormal Male indicating possible myocardial injury.   Clinicians would have to utilize clinical acumen, EKG, Troponin, and serial changes to determine if it is an Acute Myocardial Infarction or myocardial injury due to an underlying chronic condition.        LACTIC ACID, PLASMA - Normal   BLOOD CULTURE   BLOOD CULTURE   STREP PNEUMO AG, URINE OR CSF   LEGIONELLA ANTIGEN, URINE   RESPIRATORY CULTURE   RAINBOW DRAW    Narrative:     The following orders were created for panel order [place] Draw.  Procedure                               Abnormality         Status                     ---------                               -----------         ------                     Green Top (Gel)[[id number]]                                  Final result               Lavender Top[[id number]]                                     Final result               Gold Top - SST[[id number]]                                   Final result               Light Blue Top[[id number]]                                   Final result                 Please view results for these tests on the individual orders.   TROPONIN   CBC AND DIFFERENTIAL    Narrative:     The following orders were created for panel order CBC & Differential.  Procedure                               Abnormality         Status                     ---------                               -----------         ------                     CBC Auto Differential[[id number]]        Abnormal            Final result                 Please view results for these tests on the individual orders.   GREEN TOP   LAVENDER TOP   GOLD TOP - SST   LIGHT BLUE TOP       EKG:   ECG 12 Lead ED Triage Standing Order; Chest Pain   Preliminary Result   HEART RATE=86  bpm   RR Nuulmvpd=044  ms   IL Qnwozgxs=191  ms   P Horizontal Axis=7  deg   P Front Axis=13  deg   QRSD Interval=95  ms   QT Ztdiptqz=613  ms   OMcU=677  ms   QRS Axis=-59  deg   T Wave Axis=24  deg   - ABNORMAL ECG  -   Sinus rhythm   Left anterior fascicular block   Abnormal R-wave progression, early transition   Probable left ventricular hypertrophy   Date and Time of Study:2025-02-09 06:08:47          Meds given in ED:   Medications   sodium chloride 0.9 % flush 10 mL (has no administration in time range)   aspirin tablet 325 mg (0 mg Oral Hold 2/9/25 5217)   pantoprazole (PROTONIX) injection 40 mg (has no administration in time range)   cefTRIAXone (ROCEPHIN) 2,000 mg in sodium chloride 0.9 % 100 mL MBP (0 mg Intravenous Not Given 2/9/25 1117)   doxycycline (MONODOX) capsule 100 mg (has no administration in time range)   acetaminophen (TYLENOL) tablet 650 mg (has no administration in time range)   ipratropium-albuterol (DUO-NEB) nebulizer solution 3 mL (has no administration in time range)   sodium chloride 0.9 % flush 10 mL (10 mL Intravenous Given 2/9/25 0902)   sodium chloride 0.9 % flush 10 mL (has no administration in time range)   sodium chloride 0.9 % infusion 40 mL (has no administration in time range)   Pharmacy to Dose enoxaparin (LOVENOX) (has no administration in time range)   Potassium Replacement - Follow Nurse / BPA Driven Protocol (has no administration in time range)   Magnesium Standard Dose Replacement - Follow Nurse / BPA Driven Protocol (has no administration in time range)   Phosphorus Replacement - Follow Nurse / BPA Driven Protocol (has no administration in time range)   Calcium Replacement - Follow Nurse / BPA Driven Protocol (has no administration in time range)   sennosides-docusate (PERICOLACE) 8.6-50 MG per tablet 2 tablet (has no administration in time range)     And   polyethylene glycol (MIRALAX) packet 17 g (has no administration in time range)     And   bisacodyl (DULCOLAX) EC tablet 5 mg (has no administration in time range)     And   bisacodyl (DULCOLAX) suppository 10 mg (has no administration in time range)   oseltamivir (TAMIFLU) capsule 30 mg (has no administration in time range)    budesonide-formoterol (SYMBICORT) 160-4.5 MCG/ACT inhaler 2 puff (2 puffs Inhalation Not Given 2/9/25 1038)   Enoxaparin Sodium (LOVENOX) syringe 40 mg (has no administration in time range)   acetaminophen (TYLENOL) tablet 1,000 mg (1,000 mg Oral Given 2/9/25 0752)   cefTRIAXone (ROCEPHIN) 2,000 mg in sodium chloride 0.9 % 100 mL MBP (0 mg Intravenous Stopped 2/9/25 0929)   lactated ringers bolus 1,000 mL (0 mL Intravenous Stopped 2/9/25 0929)       Imaging results:  XR Chest 1 View    Result Date: 2/9/2025  As described.  This report was finalized on 2/9/2025 6:53 AM by Dr. Tay Conklin M.D on Workstation: [email or web address]       Ambulatory status:   - up with standby       Social issues:   Social History     Socioeconomic History    Marital status:     Number of children: 2   Tobacco Use    Smoking status: Never    Smokeless tobacco: Never   Vaping Use    Vaping status: Never Used   Substance and Sexual Activity    Alcohol use: No    Drug use: No    Sexual activity: Yes     Partners: Male       Peripheral Neurovascular  Peripheral Neurovascular (Adult)  Peripheral Neurovascular WDL: WDL    Neuro Cognitive  Neuro Cognitive (Adult)  Cognitive/Neuro/Behavioral WDL: WDL    Learning  Learning Assessment  Learning Readiness and Ability: no barriers identified    Respiratory  Respiratory WDL  Respiratory WDL: .WDL except, rhythm/pattern, cough  Rhythm/Pattern, Respiratory: shortness of breath  Cough Frequency: frequent  Cough Type: productive  Breath Sounds  All Lung Fields Breath Sounds: All Fields  All Lung Fields Breath Sounds: Anterior:, Lateral:, equal bilaterally, diminished, rhonchi    Abdominal Pain       Pain Assessments  Pain (Adult)  (0-10) Pain Rating: Rest: 8  (0-10) Pain Rating: Activity: 8  Pain Location: chest  Pain Side/Orientation: medial  Pain Radiation to: arm, right, arm, left  Response to Pain Interventions: nonverbal indicators absent/decreased, interventions effective per patient    NIH  Stroke Scale       Larry Swan RN  02/09/25 12:39 EST

## 2025-02-09 NOTE — PLAN OF CARE
Problem: Adult Inpatient Plan of Care  Goal: Plan of Care Review  Outcome: Progressing  Flowsheets (Taken 2/9/2025 1743)  Progress: improving  Outcome Evaluation: No further c/o chest/arm pain since admit from ER. Brissa diet well. O2 at 2L, desats to 88% on RA. Denies SOA.  Plan of Care Reviewed With: patient  Goal: Patient-Specific Goal (Individualized)  Outcome: Progressing  Goal: Absence of Hospital-Acquired Illness or Injury  Outcome: Progressing  Intervention: Identify and Manage Fall Risk  Recent Flowsheet Documentation  Taken 2/9/2025 1600 by Antonina Blanco, RN  Safety Promotion/Fall Prevention:   fall prevention program maintained   safety round/check completed  Taken 2/9/2025 1420 by Antonina Blanco RN  Safety Promotion/Fall Prevention:   fall prevention program maintained   safety round/check completed  Goal: Optimal Comfort and Wellbeing  Outcome: Progressing  Goal: Readiness for Transition of Care  Outcome: Progressing  Intervention: Mutually Develop Transition Plan  Recent Flowsheet Documentation  Taken 2/9/2025 1427 by Antonina Blanco RN  Transportation Anticipated: family or friend will provide  Patient/Family Anticipated Services at Transition: none  Patient/Family Anticipates Transition to: home  Taken 2/9/2025 1423 by Antonina Blanco, RN  Equipment Currently Used at Home: none     Problem: Pneumonia  Goal: Fluid Balance  Outcome: Progressing  Goal: Absence of Infection Signs and Symptoms  Outcome: Progressing  Goal: Effective Oxygenation and Ventilation  Outcome: Progressing  Intervention: Promote Airway Secretion Clearance  Recent Flowsheet Documentation  Taken 2/9/2025 1600 by Antonina Blanco, RN  Activity Management: up ad jaky   Goal Outcome Evaluation:  Plan of Care Reviewed With: patient        Progress: improving  Outcome Evaluation: No further c/o chest/arm pain since admit from ER. Brissa diet well. O2 at 2L, desats to 88% on RA. Denies SOA.

## 2025-02-10 LAB
ANION GAP SERPL CALCULATED.3IONS-SCNC: 10.2 MMOL/L (ref 5–15)
BACTERIA SPEC RESP CULT: NORMAL
BUN SERPL-MCNC: 8 MG/DL (ref 8–23)
BUN/CREAT SERPL: 10.8 (ref 7–25)
CALCIUM SPEC-SCNC: 8.7 MG/DL (ref 8.6–10.5)
CHLORIDE SERPL-SCNC: 100 MMOL/L (ref 98–107)
CO2 SERPL-SCNC: 26.8 MMOL/L (ref 22–29)
CREAT SERPL-MCNC: 0.74 MG/DL (ref 0.57–1)
DEPRECATED RDW RBC AUTO: 43.5 FL (ref 37–54)
EGFRCR SERPLBLD CKD-EPI 2021: 86.6 ML/MIN/1.73
ERYTHROCYTE [DISTWIDTH] IN BLOOD BY AUTOMATED COUNT: 13.3 % (ref 12.3–15.4)
GLUCOSE SERPL-MCNC: 95 MG/DL (ref 65–99)
GRAM STN SPEC: NORMAL
HCT VFR BLD AUTO: 39.1 % (ref 34–46.6)
HGB BLD-MCNC: 12.6 G/DL (ref 12–15.9)
MCH RBC QN AUTO: 28.7 PG (ref 26.6–33)
MCHC RBC AUTO-ENTMCNC: 32.2 G/DL (ref 31.5–35.7)
MCV RBC AUTO: 89.1 FL (ref 79–97)
PLATELET # BLD AUTO: 197 10*3/MM3 (ref 140–450)
PMV BLD AUTO: 10 FL (ref 6–12)
POTASSIUM SERPL-SCNC: 3.6 MMOL/L (ref 3.5–5.2)
POTASSIUM SERPL-SCNC: 4.3 MMOL/L (ref 3.5–5.2)
RBC # BLD AUTO: 4.39 10*6/MM3 (ref 3.77–5.28)
SODIUM SERPL-SCNC: 137 MMOL/L (ref 136–145)
WBC NRBC COR # BLD AUTO: 1.87 10*3/MM3 (ref 3.4–10.8)

## 2025-02-10 PROCEDURE — 80048 BASIC METABOLIC PNL TOTAL CA: CPT | Performed by: STUDENT IN AN ORGANIZED HEALTH CARE EDUCATION/TRAINING PROGRAM

## 2025-02-10 PROCEDURE — 25010000002 CEFTRIAXONE PER 250 MG: Performed by: STUDENT IN AN ORGANIZED HEALTH CARE EDUCATION/TRAINING PROGRAM

## 2025-02-10 PROCEDURE — 94799 UNLISTED PULMONARY SVC/PX: CPT

## 2025-02-10 PROCEDURE — 94664 DEMO&/EVAL PT USE INHALER: CPT

## 2025-02-10 PROCEDURE — 84132 ASSAY OF SERUM POTASSIUM: CPT | Performed by: STUDENT IN AN ORGANIZED HEALTH CARE EDUCATION/TRAINING PROGRAM

## 2025-02-10 PROCEDURE — 63710000001 PREDNISONE PER 1 MG: Performed by: STUDENT IN AN ORGANIZED HEALTH CARE EDUCATION/TRAINING PROGRAM

## 2025-02-10 PROCEDURE — 25010000002 ONDANSETRON PER 1 MG: Performed by: STUDENT IN AN ORGANIZED HEALTH CARE EDUCATION/TRAINING PROGRAM

## 2025-02-10 PROCEDURE — 87205 SMEAR GRAM STAIN: CPT | Performed by: STUDENT IN AN ORGANIZED HEALTH CARE EDUCATION/TRAINING PROGRAM

## 2025-02-10 PROCEDURE — 85027 COMPLETE CBC AUTOMATED: CPT | Performed by: STUDENT IN AN ORGANIZED HEALTH CARE EDUCATION/TRAINING PROGRAM

## 2025-02-10 RX ORDER — PREDNISONE 20 MG/1
40 TABLET ORAL
Status: DISCONTINUED | OUTPATIENT
Start: 2025-02-10 | End: 2025-02-12 | Stop reason: HOSPADM

## 2025-02-10 RX ORDER — OSELTAMIVIR PHOSPHATE 75 MG/1
75 CAPSULE ORAL EVERY 12 HOURS SCHEDULED
Status: DISCONTINUED | OUTPATIENT
Start: 2025-02-10 | End: 2025-02-12 | Stop reason: HOSPADM

## 2025-02-10 RX ORDER — ATORVASTATIN CALCIUM 20 MG/1
20 TABLET, FILM COATED ORAL DAILY
Status: DISCONTINUED | OUTPATIENT
Start: 2025-02-10 | End: 2025-02-12 | Stop reason: HOSPADM

## 2025-02-10 RX ORDER — TRAZODONE HYDROCHLORIDE 50 MG/1
50 TABLET, FILM COATED ORAL NIGHTLY
Status: DISCONTINUED | OUTPATIENT
Start: 2025-02-10 | End: 2025-02-12 | Stop reason: HOSPADM

## 2025-02-10 RX ORDER — SUMATRIPTAN SUCCINATE 100 MG/1
100 TABLET ORAL
Status: DISCONTINUED | OUTPATIENT
Start: 2025-02-10 | End: 2025-02-12 | Stop reason: HOSPADM

## 2025-02-10 RX ORDER — SACCHAROMYCES BOULARDII 250 MG
250 CAPSULE ORAL 2 TIMES DAILY
Status: DISCONTINUED | OUTPATIENT
Start: 2025-02-10 | End: 2025-02-12 | Stop reason: HOSPADM

## 2025-02-10 RX ORDER — MULTIVITAMIN WITH IRON
1000 TABLET ORAL DAILY
Status: DISCONTINUED | OUTPATIENT
Start: 2025-02-10 | End: 2025-02-12 | Stop reason: HOSPADM

## 2025-02-10 RX ORDER — ONDANSETRON 2 MG/ML
4 INJECTION INTRAMUSCULAR; INTRAVENOUS EVERY 6 HOURS PRN
Status: DISCONTINUED | OUTPATIENT
Start: 2025-02-10 | End: 2025-02-12 | Stop reason: HOSPADM

## 2025-02-10 RX ORDER — POTASSIUM CHLORIDE 750 MG/1
40 TABLET, FILM COATED, EXTENDED RELEASE ORAL EVERY 4 HOURS
Status: COMPLETED | OUTPATIENT
Start: 2025-02-10 | End: 2025-02-10

## 2025-02-10 RX ADMIN — Medication 1000 MCG: at 09:11

## 2025-02-10 RX ADMIN — ONDANSETRON 4 MG: 2 INJECTION INTRAMUSCULAR; INTRAVENOUS at 12:48

## 2025-02-10 RX ADMIN — Medication 250 MG: at 09:11

## 2025-02-10 RX ADMIN — OSELTAMIVIR PHOSPHATE 75 MG: 75 CAPSULE ORAL at 20:36

## 2025-02-10 RX ADMIN — Medication 10 ML: at 09:12

## 2025-02-10 RX ADMIN — OSELTAMIVIR PHOSPHATE 75 MG: 75 CAPSULE ORAL at 09:11

## 2025-02-10 RX ADMIN — BUDESONIDE AND FORMOTEROL FUMARATE DIHYDRATE 2 PUFF: 160; 4.5 AEROSOL RESPIRATORY (INHALATION) at 21:37

## 2025-02-10 RX ADMIN — CEFTRIAXONE 2000 MG: 2 INJECTION, POWDER, FOR SOLUTION INTRAMUSCULAR; INTRAVENOUS at 09:11

## 2025-02-10 RX ADMIN — PANTOPRAZOLE SODIUM 40 MG: 40 INJECTION, POWDER, LYOPHILIZED, FOR SOLUTION INTRAVENOUS at 05:57

## 2025-02-10 RX ADMIN — Medication 250 MG: at 20:36

## 2025-02-10 RX ADMIN — TRAZODONE HYDROCHLORIDE 50 MG: 50 TABLET ORAL at 20:36

## 2025-02-10 RX ADMIN — POTASSIUM CHLORIDE 40 MEQ: 750 TABLET, EXTENDED RELEASE ORAL at 09:10

## 2025-02-10 RX ADMIN — Medication 10 ML: at 20:36

## 2025-02-10 RX ADMIN — PREDNISONE 40 MG: 20 TABLET ORAL at 14:18

## 2025-02-10 RX ADMIN — ATORVASTATIN CALCIUM 20 MG: 20 TABLET, FILM COATED ORAL at 09:11

## 2025-02-10 RX ADMIN — POTASSIUM CHLORIDE 40 MEQ: 750 TABLET, EXTENDED RELEASE ORAL at 14:17

## 2025-02-10 RX ADMIN — BUDESONIDE AND FORMOTEROL FUMARATE DIHYDRATE 2 PUFF: 160; 4.5 AEROSOL RESPIRATORY (INHALATION) at 08:44

## 2025-02-10 NOTE — PLAN OF CARE
Goal Outcome Evaluation:      Pt alert and oriented. VSS. On 2L O2 NC. No acute distress noted. Plan of care ongoing.

## 2025-02-10 NOTE — PLAN OF CARE
Goal Outcome Evaluation:  Patient alert and oriented. 1L NC. VSS. Medicated per MAR. Zofran giving for nausea. Potassium replaced per protocol. Plan of care ongoing.

## 2025-02-10 NOTE — CASE MANAGEMENT/SOCIAL WORK
Discharge Planning Assessment  [facility]     Patient Name: Pebbles Khan  MRN: [id number]  Today's Date: 2/10/2025    Admit Date: 2/9/2025    Plan: Home with spouse; follow for 02 needs   Discharge Needs Assessment       Row Name 02/10/25 1348       Living Environment    People in Home spouse    Current Living Arrangements home    Potentially Unsafe Housing Conditions none    Primary Care Provided by self    Provides Primary Care For no one    Family Caregiver if Needed spouse    Quality of Family Relationships involved;helpful    Able to Return to Prior Arrangements yes       Resource/Environmental Concerns    Resource/Environmental Concerns none    Transportation Concerns none       Transition Planning    Patient/Family Anticipates Transition to home    Patient/Family Anticipated Services at Transition none    Transportation Anticipated family or friend will provide       Discharge Needs Assessment    Readmission Within the Last 30 Days no previous admission in last 30 days    Equipment Currently Used at Home none    Concerns to be Addressed no discharge needs identified;denies needs/concerns at this time    Anticipated Changes Related to Illness none    Equipment Needed After Discharge none                   Discharge Plan       Row Name 02/10/25 1348       Plan    Plan Home with spouse; follow for 02 needs    Plan Comments CCP met with patient at bedside. CCP role explained and discharge planning discussed. Face sheet verified and POTTS noted. Patient's PCP is Dr. Bejarano. Patient lives with her spouse, with a few steps to the entrance. Patient's bedroom is upstairs (8 steps). Patient is independent with her ADLs and does not use DME. Patient has not used home health or been to SNF. Patient plans to return home at d/c. CCP will follow for 02 needs and assist as needed. Lynn OSORIO                  Continued Care and Services - Admitted Since 2/9/2025    No active coordination exists for this encounter.        Selected Continued Care - Episodes Includes continued care and service providers with selected services from the active episodes listed below      High Risk Care Management Episode start date: 2/10/2025   There are no active outsourced providers for this episode.                    Demographic Summary       Row Name 02/10/25 1345       General Information    Admission Type observation    Arrived From emergency department    Required Notices Provided Observation Status Notice    Referral Source admission list    Reason for Consult discharge planning    Preferred Language English                   Functional Status       Row Name 02/10/25 1348       Functional Status    Usual Activity Tolerance good    Current Activity Tolerance good       Functional Status, IADL    Medications independent    Meal Preparation independent    Housekeeping independent    Laundry independent    Shopping independent       Mental Status    General Appearance WDL WDL       Mental Status Summary    Recent Changes in Mental Status/Cognitive Functioning no changes                   Psychosocial    No documentation.                  Abuse/Neglect    No documentation.                  Legal    No documentation.                  Substance Abuse    No documentation.                  Patient Forms    No documentation.                     JO Montoya     Quality 110: Preventive Care And Screening: Influenza Immunization: Influenza immunization was not ordered or administered, reason not given Quality 111:Pneumonia Vaccination Status For Older Adults: Pneumococcal Vaccination not Administered or Previously Received, Reason not Otherwise Specified Quality 226: Preventive Care And Screening: Tobacco Use: Screening And Cessation Intervention: Patient screened for tobacco and is an ex-smoker Detail Level: Generalized

## 2025-02-10 NOTE — PROGRESS NOTES
Name: Pebbles Khan ADMIT: 2025   : 1955  PCP: Claudio Bejarano MD    MRN: [id number] LOS: 0 days   AGE/SEX: 69 y.o. female  ROOM: [facility]     Subjective   Subjective   2/10/2025  Patient seen and examined at bedside, states she was feeling improved this morning but now feels fatigued and admits to dyspnea with exertion.  Currently on 1 L nasal cannula.       Objective   Objective   Vital Signs  Temp:  [97.9 °F (36.6 °C)-99.5 °F (37.5 °C)] 97.9 °F (36.6 °C)  Heart Rate:  [68-80] 80  Resp:  [16-22] 18  BP: (125-136)/(64-76) 131/76  SpO2:  [88 %-98 %] 93 %  on  Flow (L/min) (Oxygen Therapy):  [0.5-2] 0.5;   Device (Oxygen Therapy): nasal cannula  Body mass index is 25.66 kg/m².  Physical Exam  Constitutional:       Appearance: She is ill-appearing.      Comments: Weakness   HENT:      Head: Normocephalic and atraumatic.      Nose: No congestion.      Mouth/Throat:      Pharynx: Oropharynx is clear. No oropharyngeal exudate.   Eyes:      General: No scleral icterus.  Cardiovascular:      Rate and Rhythm: Normal rate and regular rhythm.      Heart sounds: No murmur heard.     No friction rub. No gallop.   Pulmonary:      Effort: No respiratory distress.      Breath sounds: Rales present. No wheezing.      Comments: Patient on 1 L nasal cannula  Abdominal:      General: There is no distension.      Tenderness: There is no abdominal tenderness. There is no guarding.   Musculoskeletal:         General: No swelling or deformity.      Cervical back: Normal range of motion. No rigidity.      Right lower leg: No edema.      Left lower leg: No edema.   Skin:     Coloration: Skin is not jaundiced.      Findings: No bruising or lesion.   Neurological:      General: No focal deficit present.      Mental Status: She is alert and oriented to person, place, and time.      Motor: No weakness.       Results Review     I reviewed the patient's new clinical results.  Results from last 7 days   Lab Units 02/10/25  0611  "02/09/25  0620   WBC 10*3/mm3 1.87* 2.37*   HEMOGLOBIN g/dL 12.6 14.0   PLATELETS 10*3/mm3 197 229     Results from last 7 days   Lab Units 02/10/25  0611 02/09/25  0620   SODIUM mmol/L 137 135*   POTASSIUM mmol/L 3.6 3.5   CHLORIDE mmol/L 100 99   CO2 mmol/L 26.8 24.0   BUN mg/dL 8 13   CREATININE mg/dL 0.74 1.01*   GLUCOSE mg/dL 95 104*   EGFR mL/min/1.73 86.6 59.6*     Results from last 7 days   Lab Units 02/09/25  0620   ALBUMIN g/dL 3.8   BILIRUBIN mg/dL 0.4   ALK PHOS U/L 62   AST (SGOT) U/L 44*   ALT (SGPT) U/L 27     Results from last 7 days   Lab Units 02/10/25  0611 02/09/25  0620   CALCIUM mg/dL 8.7 8.9   ALBUMIN g/dL  --  3.8     Results from last 7 days   Lab Units 02/09/25  0812 02/09/25  0620   PROCALCITONIN ng/mL  --  0.29*   LACTATE mmol/L 1.0  --      No results found for: \"HGBA1C\", \"POCGLU\"    XR Chest 1 View    Result Date: 2/9/2025  As described.  This report was finalized on 2/9/2025 6:53 AM by Dr. Tay Conklin M.D [facility]station[facility]       I have personally reviewed all medications:  Scheduled Medications  aspirin, 325 mg, Oral, Once  atorvastatin, 20 mg, Oral, Daily  budesonide-formoterol, 2 puff, Inhalation, BID - RT  cefTRIAXone, 2,000 mg, Intravenous, Q24H  enoxaparin, 40 mg, Subcutaneous, Q24H  melatonin, 5 mg, Oral, Nightly  oseltamivir, 75 mg, Oral, Q12H  pantoprazole, 40 mg, Intravenous, Q AM  potassium chloride ER, 40 mEq, Oral, Q4H  saccharomyces boulardii, 250 mg, Oral, BID  sodium chloride, 10 mL, Intravenous, Q12H  vitamin B-12, 1,000 mcg, Oral, Daily    Infusions   Diet  Diet: Cardiac, Diabetic; Healthy Heart (2-3 Na+); Consistent Carbohydrate; Fluid Consistency: Thin (IDDSI 0)    I have personally reviewed:  [x]  Laboratory   [x]  Microbiology   [x]  Radiology   [x]  EKG/Telemetry  [x]  Cardiology/Vascular   []  Pathology    []  Records       Assessment/Plan     Active Hospital Problems    Diagnosis  POA    **Pneumonia [J18.9]  Yes      Resolved Hospital Problems "   No resolved problems to display.       69 y.o. female admitted with Pneumonia.    #Pneumonia  #Hypoxia  #Leukopenia    -Chest x-ray shows pneumonia at the left base    -Patient is currently on 1 L nasal cannula    -Rocephin 2 g IV daily    -Doxycycline de-escalated as strep and Legionella are negative    -Start prednisone 40 mg p.o. daily    -Strep, Legionella, sputum, blood cultures    -RVP    -Tylenol as needed fever    -DuoNeb as needed    -Symbicort twice daily    - HS troponin 17 > 18, delta 1, trend, suspect 2/2 demand, denies chest pain    - wbc 2.37 > 1.87 , trend, suspect decreased due to viral infection    - procal 0.29     #Influenza    -RVP positive for influenza A H1    -Tamiflu, renally dosed     #GERD    -PPI      SCDs for DVT prophylaxis.  Full code.  Discussed with patient.  Anticipate discharge home with HH vs SNU facility in 2-3 days.  Expected discharge date/ time has not been documented.      Britney Batres DO  [facility]  02/10/25  13:39 EST

## 2025-02-11 LAB
ANION GAP SERPL CALCULATED.3IONS-SCNC: 9.7 MMOL/L (ref 5–15)
BUN SERPL-MCNC: 10 MG/DL (ref 8–23)
BUN/CREAT SERPL: 15.4 (ref 7–25)
CALCIUM SPEC-SCNC: 9 MG/DL (ref 8.6–10.5)
CHLORIDE SERPL-SCNC: 101 MMOL/L (ref 98–107)
CO2 SERPL-SCNC: 25.3 MMOL/L (ref 22–29)
CREAT SERPL-MCNC: 0.65 MG/DL (ref 0.57–1)
DEPRECATED RDW RBC AUTO: 42.8 FL (ref 37–54)
EGFRCR SERPLBLD CKD-EPI 2021: 94.3 ML/MIN/1.73
ERYTHROCYTE [DISTWIDTH] IN BLOOD BY AUTOMATED COUNT: 13.5 % (ref 12.3–15.4)
GLUCOSE SERPL-MCNC: 134 MG/DL (ref 65–99)
HCT VFR BLD AUTO: 38 % (ref 34–46.6)
HGB BLD-MCNC: 12.8 G/DL (ref 12–15.9)
MCH RBC QN AUTO: 29.4 PG (ref 26.6–33)
MCHC RBC AUTO-ENTMCNC: 33.7 G/DL (ref 31.5–35.7)
MCV RBC AUTO: 87.4 FL (ref 79–97)
PLATELET # BLD AUTO: 193 10*3/MM3 (ref 140–450)
PMV BLD AUTO: 9.4 FL (ref 6–12)
POTASSIUM SERPL-SCNC: 4.5 MMOL/L (ref 3.5–5.2)
RBC # BLD AUTO: 4.35 10*6/MM3 (ref 3.77–5.28)
SODIUM SERPL-SCNC: 136 MMOL/L (ref 136–145)
WBC NRBC COR # BLD AUTO: 0.71 10*3/MM3 (ref 3.4–10.8)

## 2025-02-11 PROCEDURE — 63710000001 PREDNISONE PER 1 MG: Performed by: STUDENT IN AN ORGANIZED HEALTH CARE EDUCATION/TRAINING PROGRAM

## 2025-02-11 PROCEDURE — 99223 1ST HOSP IP/OBS HIGH 75: CPT | Performed by: INTERNAL MEDICINE

## 2025-02-11 PROCEDURE — 85027 COMPLETE CBC AUTOMATED: CPT | Performed by: STUDENT IN AN ORGANIZED HEALTH CARE EDUCATION/TRAINING PROGRAM

## 2025-02-11 PROCEDURE — 94799 UNLISTED PULMONARY SVC/PX: CPT

## 2025-02-11 PROCEDURE — 25010000002 ONDANSETRON PER 1 MG: Performed by: STUDENT IN AN ORGANIZED HEALTH CARE EDUCATION/TRAINING PROGRAM

## 2025-02-11 PROCEDURE — 94664 DEMO&/EVAL PT USE INHALER: CPT

## 2025-02-11 PROCEDURE — 25010000002 CEFTRIAXONE PER 250 MG: Performed by: STUDENT IN AN ORGANIZED HEALTH CARE EDUCATION/TRAINING PROGRAM

## 2025-02-11 PROCEDURE — 80048 BASIC METABOLIC PNL TOTAL CA: CPT | Performed by: STUDENT IN AN ORGANIZED HEALTH CARE EDUCATION/TRAINING PROGRAM

## 2025-02-11 PROCEDURE — 36415 COLL VENOUS BLD VENIPUNCTURE: CPT | Performed by: STUDENT IN AN ORGANIZED HEALTH CARE EDUCATION/TRAINING PROGRAM

## 2025-02-11 RX ORDER — DOXYCYCLINE 100 MG/1
100 CAPSULE ORAL EVERY 12 HOURS SCHEDULED
Status: DISCONTINUED | OUTPATIENT
Start: 2025-02-11 | End: 2025-02-11

## 2025-02-11 RX ADMIN — Medication 250 MG: at 21:25

## 2025-02-11 RX ADMIN — ATORVASTATIN CALCIUM 20 MG: 20 TABLET, FILM COATED ORAL at 08:27

## 2025-02-11 RX ADMIN — Medication 250 MG: at 08:28

## 2025-02-11 RX ADMIN — Medication 10 ML: at 08:28

## 2025-02-11 RX ADMIN — OSELTAMIVIR PHOSPHATE 75 MG: 75 CAPSULE ORAL at 21:24

## 2025-02-11 RX ADMIN — BUDESONIDE AND FORMOTEROL FUMARATE DIHYDRATE 2 PUFF: 160; 4.5 AEROSOL RESPIRATORY (INHALATION) at 09:01

## 2025-02-11 RX ADMIN — PREDNISONE 40 MG: 20 TABLET ORAL at 08:27

## 2025-02-11 RX ADMIN — CEFTRIAXONE 2000 MG: 2 INJECTION, POWDER, FOR SOLUTION INTRAMUSCULAR; INTRAVENOUS at 08:27

## 2025-02-11 RX ADMIN — PANTOPRAZOLE SODIUM 40 MG: 40 INJECTION, POWDER, LYOPHILIZED, FOR SOLUTION INTRAVENOUS at 06:04

## 2025-02-11 RX ADMIN — OSELTAMIVIR PHOSPHATE 75 MG: 75 CAPSULE ORAL at 08:27

## 2025-02-11 RX ADMIN — Medication 5 MG: at 21:25

## 2025-02-11 RX ADMIN — Medication 1000 MCG: at 08:26

## 2025-02-11 RX ADMIN — BUDESONIDE AND FORMOTEROL FUMARATE DIHYDRATE 2 PUFF: 160; 4.5 AEROSOL RESPIRATORY (INHALATION) at 20:40

## 2025-02-11 RX ADMIN — Medication 10 ML: at 21:25

## 2025-02-11 RX ADMIN — ONDANSETRON 4 MG: 2 INJECTION INTRAMUSCULAR; INTRAVENOUS at 06:04

## 2025-02-11 NOTE — PLAN OF CARE
Goal Outcome Evaluation:      Pt alert and oriented. VSS. On 1L O2 NC. Pt denies SOA. No acute distress noted.

## 2025-02-11 NOTE — SIGNIFICANT NOTE
02/11/25 0902   OTHER   Discipline physical therapist   Therapy Assessment/Plan (PT)   Criteria for Skilled Interventions Met (PT) no problems identified which require skilled intervention     70 yo admitted with pna. Per adult pt profile, pt with no mobility issues PTA. Per adult PCS, pt is up indep and with [facility]. No indication for acute PT. Continue to facilitate HLM per [facility] (250' or more)

## 2025-02-11 NOTE — PROGRESS NOTES
Name: Pebbles Khan ADMIT: 2025   : 1955  PCP: Claudio Bejarano MD    MRN: [id number] LOS: 1 days   AGE/SEX: 69 y.o. female  ROOM: [facility]     Subjective   Subjective   2/10/2025  Patient seen and examined at bedside, states she was feeling improved this morning but now feels fatigued and admits to dyspnea with exertion.  Currently on 1 L nasal cannula.    2025  Patient states she is feeling much improved and slept well.  Has been on room air at times.  However white count continues to decrease, will have heme/onc take a look.       Objective   Objective   Vital Signs  Temp:  [98.1 °F (36.7 °C)-99.1 °F (37.3 °C)] 98.2 °F (36.8 °C)  Heart Rate:  [55-81] 55  Resp:  [18] 18  BP: (130-140)/() 130/65  SpO2:  [91 %-94 %] 91 %  on  Flow (L/min) (Oxygen Therapy):  [0.5-1] 0.5;   Device (Oxygen Therapy): nasal cannula  Body mass index is 25.39 kg/m².  Physical Exam  Constitutional:       Comments: Appears improved   HENT:      Head: Normocephalic and atraumatic.      Nose: No congestion.      Mouth/Throat:      Pharynx: Oropharynx is clear. No oropharyngeal exudate.   Eyes:      General: No scleral icterus.  Cardiovascular:      Rate and Rhythm: Normal rate and regular rhythm.      Heart sounds: No murmur heard.     No friction rub. No gallop.   Pulmonary:      Effort: No respiratory distress.      Breath sounds: No wheezing or rales.      Comments: Patient on 1 L nasal cannula  Abdominal:      General: There is no distension.      Tenderness: There is no abdominal tenderness. There is no guarding.   Musculoskeletal:         General: No swelling or deformity.      Cervical back: Normal range of motion. No rigidity.      Right lower leg: No edema.      Left lower leg: No edema.   Skin:     Coloration: Skin is not jaundiced.      Findings: No bruising or lesion.   Neurological:      General: No focal deficit present.      Mental Status: She is alert and oriented to person, place, and time.      Motor:  "No weakness.       Results Review     I reviewed the patient's new clinical results.  Results from last 7 days   Lab Units 02/11/25  0455 02/10/25  0611 02/09/25  0620   WBC 10*3/mm3 0.71* 1.87* 2.37*   HEMOGLOBIN g/dL 12.8 12.6 14.0   PLATELETS 10*3/mm3 193 197 229     Results from last 7 days   Lab Units 02/11/25  0455 02/10/25  1710 02/10/25  0611 02/09/25  0620   SODIUM mmol/L 136  --  137 135*   POTASSIUM mmol/L 4.5 4.3 3.6 3.5   CHLORIDE mmol/L 101  --  100 99   CO2 mmol/L 25.3  --  26.8 24.0   BUN mg/dL 10  --  8 13   CREATININE mg/dL 0.65  --  0.74 1.01*   GLUCOSE mg/dL 134*  --  95 104*   EGFR mL/min/1.73 94.3  --  86.6 59.6*     Results from last 7 days   Lab Units 02/09/25  0620   ALBUMIN g/dL 3.8   BILIRUBIN mg/dL 0.4   ALK PHOS U/L 62   AST (SGOT) U/L 44*   ALT (SGPT) U/L 27     Results from last 7 days   Lab Units 02/11/25  0455 02/10/25  0611 02/09/25  0620   CALCIUM mg/dL 9.0 8.7 8.9   ALBUMIN g/dL  --   --  3.8     Results from last 7 days   Lab Units 02/09/25  0812 02/09/25  0620   PROCALCITONIN ng/mL  --  0.29*   LACTATE mmol/L 1.0  --      No results found for: \"HGBA1C\", \"POCGLU\"    No radiology results for the last day    I have personally reviewed all medications:  Scheduled Medications  aspirin, 325 mg, Oral, Once  atorvastatin, 20 mg, Oral, Daily  budesonide-formoterol, 2 puff, Inhalation, BID - RT  cefTRIAXone, 2,000 mg, Intravenous, Q24H  enoxaparin, 40 mg, Subcutaneous, Q24H  melatonin, 5 mg, Oral, Nightly  oseltamivir, 75 mg, Oral, Q12H  pantoprazole, 40 mg, Intravenous, Q AM  predniSONE, 40 mg, Oral, Daily With Breakfast  saccharomyces boulardii, 250 mg, Oral, BID  sodium chloride, 10 mL, Intravenous, Q12H  traZODone, 50 mg, Oral, Nightly  vitamin B-12, 1,000 mcg, Oral, Daily    Infusions   Diet  Diet: Regular/House; Neutropenic/Low Microbial; Fluid Consistency: Thin (IDDSI 0)    I have personally reviewed:  [x]  Laboratory   [x]  Microbiology   [x]  Radiology   [x]  EKG/Telemetry  [x]  " Cardiology/Vascular   []  Pathology    []  Records       Assessment/Plan     Active Hospital Problems    Diagnosis  POA    **Pneumonia [J18.9]  Yes      Resolved Hospital Problems   No resolved problems to display.       69 y.o. female admitted with Pneumonia.    #Pneumonia  #Hypoxia  #Leukopenia    -Chest x-ray shows pneumonia at the left base    -Patient is currently on 1 L nasal cannula    -Antibiotics de-escalated as heme/onc has concerns that could be contributing to neutropenia    -ID consulted as per heme/onc recommendations    -Continue prednisone 40 mg p.o. daily    -Strep, Legionella, sputum, blood cultures    -RVP positive for influenza A H1    -Tylenol as needed fever    -DuoNeb as needed    -Symbicort twice daily    - HS troponin 17 > 18, delta 1, trend, suspect 2/2 demand, denies chest pain    - wbc 2.37 > 1.87 > 0.71  , trend, suspect decreased due to viral infection    -Neutropenic precautions, neutropenic diet    -Heme/onc consulted    - procal 0.29     #Influenza    -RVP positive for influenza A H1    -Tamiflu, renally dosed     #GERD    -PPI      SCDs for DVT prophylaxis.  Full code.  Discussed with patient.  Anticipate discharge home with HH vs SNU facility in 2-3 days.  Expected Discharge Date: 2/13/2025; Expected Discharge Time:  9:00 AM      DO [facility]  02/11/25  15:19 EST

## 2025-02-11 NOTE — PLAN OF CARE
Goal Outcome Evaluation:  Patient alert and oriented. On RA. VSS. Medicated per MAR. Plan of care ongoing.

## 2025-02-12 ENCOUNTER — READMISSION MANAGEMENT (OUTPATIENT)
Dept: CALL CENTER | Facility: HOSPITAL | Age: 70
End: 2025-02-12
Payer: MEDICARE

## 2025-02-12 VITALS
BODY MASS INDEX: 25.64 KG/M2 | WEIGHT: 139.33 LBS | SYSTOLIC BLOOD PRESSURE: 109 MMHG | TEMPERATURE: 98.4 F | HEIGHT: 62 IN | HEART RATE: 92 BPM | OXYGEN SATURATION: 94 % | DIASTOLIC BLOOD PRESSURE: 74 MMHG | RESPIRATION RATE: 18 BRPM

## 2025-02-12 DIAGNOSIS — D70.9 NEUTROPENIA, UNSPECIFIED TYPE: Primary | ICD-10-CM

## 2025-02-12 PROBLEM — J10.00 PNEUMONIA DUE TO INFLUENZA A VIRUS: Status: ACTIVE | Noted: 2025-02-12

## 2025-02-12 LAB
ANION GAP SERPL CALCULATED.3IONS-SCNC: 10 MMOL/L (ref 5–15)
BUN SERPL-MCNC: 14 MG/DL (ref 8–23)
BUN/CREAT SERPL: 17.9 (ref 7–25)
CALCIUM SPEC-SCNC: 9.2 MG/DL (ref 8.6–10.5)
CHLORIDE SERPL-SCNC: 103 MMOL/L (ref 98–107)
CO2 SERPL-SCNC: 26 MMOL/L (ref 22–29)
CREAT SERPL-MCNC: 0.78 MG/DL (ref 0.57–1)
DEPRECATED RDW RBC AUTO: 42.9 FL (ref 37–54)
EGFRCR SERPLBLD CKD-EPI 2021: 81.3 ML/MIN/1.73
ERYTHROCYTE [DISTWIDTH] IN BLOOD BY AUTOMATED COUNT: 13.4 % (ref 12.3–15.4)
FOLATE SERPL-MCNC: >20 NG/ML (ref 4.78–24.2)
GLUCOSE SERPL-MCNC: 85 MG/DL (ref 65–99)
HCT VFR BLD AUTO: 40.7 % (ref 34–46.6)
HGB BLD-MCNC: 13.6 G/DL (ref 12–15.9)
LDH SERPL-CCNC: 230 U/L (ref 135–214)
MCH RBC QN AUTO: 29.4 PG (ref 26.6–33)
MCHC RBC AUTO-ENTMCNC: 33.4 G/DL (ref 31.5–35.7)
MCV RBC AUTO: 87.9 FL (ref 79–97)
PLATELET # BLD AUTO: 255 10*3/MM3 (ref 140–450)
PMV BLD AUTO: 10 FL (ref 6–12)
POTASSIUM SERPL-SCNC: 3.8 MMOL/L (ref 3.5–5.2)
RBC # BLD AUTO: 4.63 10*6/MM3 (ref 3.77–5.28)
SODIUM SERPL-SCNC: 139 MMOL/L (ref 136–145)
VIT B12 BLD-MCNC: >2000 PG/ML (ref 211–946)
WBC NRBC COR # BLD AUTO: 2.19 10*3/MM3 (ref 3.4–10.8)

## 2025-02-12 PROCEDURE — 88182 CELL MARKER STUDY: CPT | Performed by: INTERNAL MEDICINE

## 2025-02-12 PROCEDURE — 63710000001 PREDNISONE PER 1 MG: Performed by: STUDENT IN AN ORGANIZED HEALTH CARE EDUCATION/TRAINING PROGRAM

## 2025-02-12 PROCEDURE — 94664 DEMO&/EVAL PT USE INHALER: CPT

## 2025-02-12 PROCEDURE — 85027 COMPLETE CBC AUTOMATED: CPT | Performed by: STUDENT IN AN ORGANIZED HEALTH CARE EDUCATION/TRAINING PROGRAM

## 2025-02-12 PROCEDURE — 88184 FLOWCYTOMETRY/ TC 1 MARKER: CPT | Performed by: INTERNAL MEDICINE

## 2025-02-12 PROCEDURE — 82746 ASSAY OF FOLIC ACID SERUM: CPT | Performed by: INTERNAL MEDICINE

## 2025-02-12 PROCEDURE — 88185 FLOWCYTOMETRY/TC ADD-ON: CPT | Performed by: INTERNAL MEDICINE

## 2025-02-12 PROCEDURE — 99232 SBSQ HOSP IP/OBS MODERATE 35: CPT | Performed by: INTERNAL MEDICINE

## 2025-02-12 PROCEDURE — 94799 UNLISTED PULMONARY SVC/PX: CPT

## 2025-02-12 PROCEDURE — 99223 1ST HOSP IP/OBS HIGH 75: CPT | Performed by: STUDENT IN AN ORGANIZED HEALTH CARE EDUCATION/TRAINING PROGRAM

## 2025-02-12 PROCEDURE — 80048 BASIC METABOLIC PNL TOTAL CA: CPT | Performed by: STUDENT IN AN ORGANIZED HEALTH CARE EDUCATION/TRAINING PROGRAM

## 2025-02-12 PROCEDURE — 82607 VITAMIN B-12: CPT | Performed by: INTERNAL MEDICINE

## 2025-02-12 PROCEDURE — 83615 LACTATE (LD) (LDH) ENZYME: CPT | Performed by: INTERNAL MEDICINE

## 2025-02-12 PROCEDURE — 36415 COLL VENOUS BLD VENIPUNCTURE: CPT | Performed by: STUDENT IN AN ORGANIZED HEALTH CARE EDUCATION/TRAINING PROGRAM

## 2025-02-12 PROCEDURE — G0545 PR INHERENT VISIT TO INPT: HCPCS | Performed by: STUDENT IN AN ORGANIZED HEALTH CARE EDUCATION/TRAINING PROGRAM

## 2025-02-12 RX ORDER — OSELTAMIVIR PHOSPHATE 75 MG/1
75 CAPSULE ORAL EVERY 12 HOURS SCHEDULED
Qty: 3 CAPSULE | Refills: 0 | Status: SHIPPED | OUTPATIENT
Start: 2025-02-12 | End: 2025-02-14

## 2025-02-12 RX ADMIN — PANTOPRAZOLE SODIUM 40 MG: 40 INJECTION, POWDER, LYOPHILIZED, FOR SOLUTION INTRAVENOUS at 06:25

## 2025-02-12 RX ADMIN — Medication 250 MG: at 09:04

## 2025-02-12 RX ADMIN — OSELTAMIVIR PHOSPHATE 75 MG: 75 CAPSULE ORAL at 09:03

## 2025-02-12 RX ADMIN — ATORVASTATIN CALCIUM 20 MG: 20 TABLET, FILM COATED ORAL at 09:03

## 2025-02-12 RX ADMIN — BUDESONIDE AND FORMOTEROL FUMARATE DIHYDRATE 2 PUFF: 160; 4.5 AEROSOL RESPIRATORY (INHALATION) at 09:19

## 2025-02-12 RX ADMIN — Medication 1000 MCG: at 09:03

## 2025-02-12 RX ADMIN — Medication 10 ML: at 09:04

## 2025-02-12 RX ADMIN — PREDNISONE 40 MG: 20 TABLET ORAL at 09:03

## 2025-02-12 NOTE — DISCHARGE INSTRUCTIONS
#Discharge plan  -Follow-up with PCP in 3 to 5 days  -Follow-up with Dr. Saavedra in office within 1 month  -Take Tamiflu 75 mg p.o. twice daily for 3 more doses

## 2025-02-12 NOTE — CASE MANAGEMENT/SOCIAL WORK
Case Management Discharge Note      Final Note: Pt discharged home with spouse.  TEVIN Pollard RN         Selected Continued Care - Admitted Since 2/9/2025       Destination    No services have been selected for the patient.                Durable Medical Equipment    No services have been selected for the patient.                Dialysis/Infusion    No services have been selected for the patient.                Home Medical Care    No services have been selected for the patient.                Therapy    No services have been selected for the patient.                Community Resources    No services have been selected for the patient.                Community & DME    No services have been selected for the patient.                    Transportation Services  Private: Car    Final Discharge Disposition Code: 01 - home or self-care

## 2025-02-12 NOTE — PLAN OF CARE
Goal Outcome Evaluation:      Pt resting in bed at this time. VSS. On RA. Pt denies SOA. No acute distress noted. Plan of care ongoing.

## 2025-02-12 NOTE — DISCHARGE SUMMARY
Patient Name: Pebbles Khan  : 1955  MRN: [id number]    Date of Admission: 2025  Date of Discharge:  2025  Primary Care Physician: Claudio Bejarano MD      Chief Complaint:   Chest Pain and Shortness of Breath      Discharge Diagnoses     Active Hospital Problems    Diagnosis  POA    **Pneumonia [J18.9]  Yes    Pneumonia due to influenza A virus [J10.00]  Yes      Resolved Hospital Problems   No resolved problems to display.        Hospital Course     Ms. Khan is a 69 y.o. female who presented to [facility] initially complaining of dyspnea.  Please see the admitting history and physical for further details.  She was found to have pneumonia due to influenza A and was admitted to the hospital for further evaluation and treatment.  Patient was placed on supplemental oxygen of 2 L nasal cannula and started on Tamiflu and covered with antibiotics in case of bacterial pneumonia.  She was also started on aerosols.  She was slow to wean off oxygen, and her white count started to decrease.  Prednisone was started.  White count continued to decrease even though she was improving.  Heme/onc was consulted and recommended changing antibiotics and to get ID consult.  Neutropenia most likely secondary to influenza per ID and they are okay with patient being off antibiotics as pneumonia was most likely all viral.  Patient was on room air and near improved.  White count rebounded.  She was cleared by heme/onc and agreeable discharge.  Patient discharged home in agreement with plan below.    #Discharge plan  -Follow-up with PCP in 3 to 5 days  -Follow-up with Dr. Saavedra in office within 1 month  -Take Tamiflu 75 mg p.o. twice daily for 3 more doses    Day of Discharge     Subjective:  Patient is on room air and white count has increased, cleared by ID and heme/onc.  States she is near baseline and eager for discharge.  Will discharge home    Physical Exam:  Temp:  [98.1 °F (36.7 °C)-98.4 °F (36.9  °C)] 98.3 °F (36.8 °C)  Heart Rate:  [55-92] 91  Resp:  [18] 18  BP: (106-130)/(63-77) 106/67  Body mass index is 25.48 kg/m².  Physical Exam  Constitutional:       General: She is not in acute distress.     Appearance: Normal appearance. She is not toxic-appearing.   HENT:      Head: Normocephalic and atraumatic.      Nose: Nose normal. No congestion.      Mouth/Throat:      Pharynx: Oropharynx is clear. No oropharyngeal exudate.   Eyes:      General: No scleral icterus.  Cardiovascular:      Rate and Rhythm: Normal rate and regular rhythm.      Heart sounds: No murmur heard.     No friction rub. No gallop.   Pulmonary:      Effort: No respiratory distress.      Breath sounds: No wheezing or rales.   Abdominal:      General: There is no distension.      Tenderness: There is no abdominal tenderness. There is no guarding.   Musculoskeletal:         General: No swelling or deformity.      Cervical back: Normal range of motion. No rigidity.      Right lower leg: No edema.      Left lower leg: No edema.   Skin:     Coloration: Skin is not jaundiced.      Findings: No bruising or lesion.   Neurological:      General: No focal deficit present.      Mental Status: She is alert and oriented to person, place, and time.      Motor: No weakness.         Consultants     Consult Orders (all) (From admission, onward)       Start     Ordered    02/11/25 1627  Inpatient Infectious Diseases Consult  Once        Specialty:  Infectious Diseases  Provider:  Bran Robertson MD    02/11/25 1626    02/11/25 0731  Hematology & Oncology Inpatient Consult  Once        Specialty:  Hematology and Oncology  Provider:  Rajesh Álvarez MD    02/11/25 0730    02/09/25 0852  A (on-call MD unless specified) Details  Once        Specialty:  Hospitalist  Provider:  Britney Batres DO    02/09/25 0851                  Procedures     * Surgery not found *    Imaging Results (All)       Procedure Component Value Units Date/Time    XR  Chest 1 View [[id number]] Collected: 02/09/25 0652     Updated: 02/09/25 0657    Narrative:      XR CHEST 1 VW-     HISTORY: Female who is 69 years-old, chest pain     TECHNIQUE: Frontal view of the chest     COMPARISON: None available     FINDINGS: The heart size is borderline. Pulmonary vasculature is  unremarkable. Patchy and consolidative opacity at the left base suggest  pneumonia, follow-up recommended to characterize resolution and to  exclude any possibility of an underlying lesion. No large pleural  effusion, or pneumothorax. At least moderate hiatal hernia is suggested.  No acute osseous process.       Impression:      As described.     This report was finalized on 2/9/2025 6:53 AM by Dr. Tay Conklin M.D on Workstation: [employer]               Results for orders placed during the hospital encounter of 07/29/22    Adult Transthoracic Echo Complete W/ Cont if Necessary Per Protocol    Interpretation Summary  · Left ventricular ejection fraction appears to be 56 - 60%. Left ventricular systolic function is normal. Normal left ventricular cavity size noted. Left ventricular wall thickness is consistent with mild septal asymmetric hypertrophy. All left ventricular wall segments contract normally. Left ventricular diastolic function was indeterminate.  · There is mild calcification of the aortic valve. Mild to moderate aortic valve regurgitation is present. No hemodynamically significant aortic valve stenosis is present.  · There is mild mitral valve prolapse of the anterior mitral leaflet. Mild mitral valve regurgitation is present.    Pertinent Labs     Results from last 7 days   Lab Units 02/12/25  0603 02/11/25  0455 02/10/25  0611 02/09/25  0620   WBC 10*3/mm3 2.19* 0.71* 1.87* 2.37*   HEMOGLOBIN g/dL 13.6 12.8 12.6 14.0   PLATELETS 10*3/mm3 255 193 197 229     Results from last 7 days   Lab Units 02/12/25  0603 02/11/25  0455 02/10/25  1710 02/10/25  0611 02/09/25  0620   SODIUM mmol/L 139 136  --   "137 135*   POTASSIUM mmol/L 3.8 4.5 4.3 3.6 3.5   CHLORIDE mmol/L 103 101  --  100 99   CO2 mmol/L 26.0 25.3  --  26.8 24.0   BUN mg/dL 14 10  --  8 13   CREATININE mg/dL 0.78 0.65  --  0.74 1.01*   GLUCOSE mg/dL 85 134*  --  95 104*   EGFR mL/min/1.73 81.3 94.3  --  86.6 59.6*     Results from last 7 days   Lab Units 02/09/25  0620   ALBUMIN g/dL 3.8   BILIRUBIN mg/dL 0.4   ALK PHOS U/L 62   AST (SGOT) U/L 44*   ALT (SGPT) U/L 27     Results from last 7 days   Lab Units 02/12/25  0603 02/11/25  0455 02/10/25  0611 02/09/25  0620   CALCIUM mg/dL 9.2 9.0 8.7 8.9   ALBUMIN g/dL  --   --   --  3.8       Results from last 7 days   Lab Units 02/09/25  1358 02/09/25  0759 02/09/25  0620   HSTROP T ng/L 17* 18* 17*           Invalid input(s): \"LDLCALC\"  Results from last 7 days   Lab Units 02/10/25  0639 02/09/25  0835 02/09/25  0812   BLOODCX   --  No growth at 3 days No growth at 3 days   RESPCX  Rejected  --   --      Results from last 7 days   Lab Units 02/09/25  0743   COVID19  Not Detected       Test Results Pending at Discharge     Pending Results       Procedure [Order ID] Specimen - Date/Time    Flow Cytometry, Blood [[id number]] Collected: 02/12/25 0603    Specimen: Blood Updated: 02/12/25 0655              Discharge Details        Discharge Medications        New Medications        Instructions Start Date   oseltamivir 75 MG capsule  Commonly known as: TAMIFLU   75 mg, Oral, Every 12 Hours Scheduled             Continue These Medications        Instructions Start Date   FLORASTOR PO   2 Times Daily      lansoprazole 15 MG capsule  Commonly known as: PREVACID   15 mg, Daily      melatonin 5 MG tablet tablet   5 mg, Nightly      multivitamin with minerals tablet tablet   1 tablet, Daily      polyethyl glycol-propyl glycol 0.4-0.3 % solution ophthalmic solution (artificial tears)  Commonly known as: SYSTANE   2 drops, Every Morning      promethazine 25 MG suppository  Commonly known as: PHENERGAN   25 mg, Rectal, " Every 6 Hours PRN      simvastatin 40 MG tablet  Commonly known as: ZOCOR   40 mg, Oral, Every Night at Bedtime      SUMAtriptan 100 MG tablet  Commonly known as: IMITREX   100 mg, Oral, Every 2 Hours PRN, Take one tablet at onset of headache. May repeat dose one time in 2 hours if headache not relieved.      vitamin B-12 1000 MCG tablet  Commonly known as: CYANOCOBALAMIN   TAKE 1 TABLET BY MOUTH EVERY DAY               Allergies   Allergen Reactions    Ibuprofen Itching       Discharge Disposition:  Home or Self Care      Discharge Diet:  Diet Order   Procedures    Diet: Regular/House; Neutropenic/Low Microbial; Fluid Consistency: Thin (IDDSI 0)       Discharge Activity:       CODE STATUS:    Code Status and Medical Interventions: CPR (Attempt to Resuscitate); Full Support   Ordered at: 02/09/25 0918     Code Status (Patient has no pulse and is not breathing):    CPR (Attempt to Resuscitate)     Medical Interventions (Patient has pulse or is breathing):    Full Support       Future Appointments   Date Time Provider Department Center   7/7/2025  9:30 AM Claudio Bejarano MD [facility]  [facility]      Follow-up Information       Claudio Bejarano MD .    Specialty: Family Medicine  Contact information:  [address] [facility]  [address] [place] [address] [phone]                             Time Spent on Discharge: 35 minutes      DO [facility]  02/12/25  11:56 EST

## 2025-02-12 NOTE — PROGRESS NOTES
[facility] GROUP INPATIENT PROGRESS NOTE    Length of Stay:  2 days    CHIEF COMPLAINT:  Left lower lobe pneumonia, influenza A H1, leukopenia/neutropenia    SUBJECTIVE:   Patient has remained afebrile.  Patient ready for discharge home today.    ROS:  Review of Systems   A comprehensive review of systems was obtained with pertinent positive findings as noted in the interval history above.  All other systems negative.    OBJECTIVE:  Vitals:    02/11/25 1937 02/11/25 2355 02/12/25 0446 02/12/25 0700   BP: 124/63 116/77  106/67   BP Location:    Right arm   Patient Position:    Lying   Pulse: 64 85  92   Resp: 18 18  18   Temp: 98.1 °F (36.7 °C) 98.4 °F (36.9 °C)  98.3 °F (36.8 °C)   TempSrc: Oral Oral  Oral   SpO2: 90% 91%  94%   Weight:   63.2 kg (139 lb 5.3 oz)    Height:             PHYSICAL EXAMINATION:  General: Alert and orient x 3 no distress  Chest/Lungs: Clear to auscultation bilaterally  Heart: Regular rate and rhythm  Abdomen/GI: Soft nontender nondistended bowel signs present  Extremities: No edema    DIAGNOSTIC DATA:  Results Review:     I reviewed the patient's new clinical results.    Results from last 7 days   Lab Units 02/12/25  0603 02/11/25  0455 02/10/25  0611   WBC 10*3/mm3 2.19* 0.71* 1.87*   HEMOGLOBIN g/dL 13.6 12.8 12.6   HEMATOCRIT % 40.7 38.0 39.1   PLATELETS 10*3/mm3 255 193 197      Results from last 7 days   Lab Units 02/12/25  0603 02/11/25  0455 02/10/25  1710 02/10/25  0611 02/09/25  0620   SODIUM mmol/L 139 136  --  137 135*   POTASSIUM mmol/L 3.8 4.5 4.3 3.6 3.5   CHLORIDE mmol/L 103 101  --  100 99   CO2 mmol/L 26.0 25.3  --  26.8 24.0   BUN mg/dL 14 10  --  8 13   CREATININE mg/dL 0.78 0.65  --  0.74 1.01*   CALCIUM mg/dL 9.2 9.0  --  8.7 8.9   BILIRUBIN mg/dL  --   --   --   --  0.4   ALK PHOS U/L  --   --   --   --  62   ALT (SGPT) U/L  --   --   --   --  27   AST (SGOT) U/L  --   --   --   --  44*   GLUCOSE mg/dL 85 134*  --  95 104*      Lab Results   Component Value  Date    NEUTROABS 1.55 (L) 02/09/2025                   Assessment & Plan   ASSESSMENT/PLAN:  This is a 69 y.o. female with:       *Left lower lobe pneumonia, influenza  Patient was admitted on 2/9/2025 with pneumonia.    Chest x-ray showed infiltrate in the left base on 2/9/2025.    Respiratory viral panel was positive for influenza A H1.    Blood cultures negative.  Respiratory culture negative.    Patient received empiric Rocephin, subsequently discontinued due to concern regarding effects on WBC.    Patient also receiving Tamiflu 75 mg every 12 hours.    Patient receiving prednisone 40 mg daily.  Infectious disease consulted, did not recommend any further antibiotic coverage     *Acute leukopenia  Previous labs showed normal CBC results.    Most recent labs prior to admission from 7/1/2024 with WBC 6.4 with normal differential, hemoglobin 14.2, platelet count 285,000.    On admission 2/9/2025, WBC 2.37 with differential of 65 segs, 19 lymphs, 14 monocytes with ANC 1.55.    On 2/10/2025, WBC declined to 1.87, no differential performed.    On 2/11/2025, WBC declined to 0.71, no differential performed.    Patient has maintained normal hemoglobin and platelet count.  Most likely the patient's acute leukopenia is related to viral infection with influenza.    Concern regarding the rapid decline and consideration as to whether ceftriaxone could be worsening the WBC, ceftriaxone discontinued.    Additional labs on 2/12/2025 with  WBC today improved at 2.19.  Patient is being discharged home today.  We will arrange follow-up with CBC and RN review in 2 weeks and I will see her back in 1 months for follow-up visit     *DVT prophylaxis  Lovenox 40mg daily     PLAN:   Leukopenia/neutropenia most likely virally mediated  Unclear whether ceftriaxone was contributing factor, was discontinued  Patient is being discharged home today  We will arrange outpatient follow-up with CBC and RN review in 2 weeks and MD visit in  approximately 1 month with CBC    Discussed with patient at bedside.  Communicated with Dr. Tan.               Darrell Saavedra MD

## 2025-02-12 NOTE — CONSULTS
"Referring Provider: Britney Batres DO  Reason for Consultation:     neutropenia in setting of influenza, ID consult recommended by heme/onc     Chief Complaint   Patient presents with    Chest Pain    Shortness of Breath         Subjective   History of present illness: Patient is a 69-year-old female who presents with cough and shortness of breath.  ID consulted for \"neutropenia in the setting of influenza, ID consult recommended by heme/onc\".    Patient has been afebrile since admission with WBC dena of 0.71 now improved to 2.19 today.  Initial lactate was normal and procalcitonin unremarkable.  Initially started on Tamiflu for positive viral panel for influenza A.  Has been on ceftriaxone 2 g daily as well for bacterial pneumonia.  Started on steroids 2/10.  Chest x-ray with patchy lung base opacity in the left lower lobe.    Today patient reports she is feeling much better with no acute complaints.  Remains afebrile with improving WBC.  Tolerating antibiotics and antivirals.    Past Medical History:   Diagnosis Date    GERD (gastroesophageal reflux disease)     Hyperlipidemia     Migraines        Past Surgical History:   Procedure Laterality Date    CHOLECYSTECTOMY      COLON RESECTION      COLONOSCOPY      COLONOSCOPY N/A 2/23/2023    Procedure: COLONOSCOPY FOR SCREENING- 3 YEAR SCREENING;  Surgeon: Mikael Steiner MD;  [place] [facility] [place]  Service: Gastroenterology;  Laterality: N/A;  Diverticulosis    ENDOSCOPY         family history includes Lung cancer in her father.     reports that she has never smoked. She has never used smokeless tobacco. She reports that she does not drink alcohol and does not use drugs.     Allergies   Allergen Reactions    Ibuprofen Itching       Medication:  Antibiotics:  Anti-Infectives (From admission, onward)      Ordered     Dose/Rate Route Frequency Start Stop    02/11/25 0768  doxycycline (MONODOX) capsule 100 mg  Status:  Discontinued        Ordering Provider: " Britney Batres DO    100 mg Oral Every 12 Hours Scheduled 02/11/25 2100 02/11/25 1626    02/10/25 0714  oseltamivir (TAMIFLU) capsule 75 mg        Ordering Provider: Britney Batres DO    75 mg Oral Every 12 Hours Scheduled 02/10/25 0900 02/14/25 0859    02/09/25 0919  doxycycline (MONODOX) capsule 100 mg  Status:  Discontinued        Ordering Provider: Britney Batres DO    100 mg Oral Every 12 Hours Scheduled 02/09/25 1300 02/10/25 0713    02/09/25 0919  oseltamivir (TAMIFLU) capsule 30 mg  Status:  Discontinued        Ordering Provider: Britney Batres DO    30 mg Oral Every 12 Hours Scheduled 02/09/25 1300 02/10/25 0714    02/09/25 0919  cefTRIAXone (ROCEPHIN) 2,000 mg in sodium chloride 0.9 % 100 mL MBP  Status:  Discontinued        Ordering Provider: Britney Batres DO    2,000 mg  200 mL/hr over 30 Minutes Intravenous Every 24 Hours 02/09/25 0934 02/11/25 1623    02/09/25 0756  cefTRIAXone (ROCEPHIN) 2,000 mg in sodium chloride 0.9 % 100 mL MBP        Ordering Provider: Desmond Paredes MD    2,000 mg  200 mL/hr over 30 Minutes Intravenous Once 02/09/25 0812 02/09/25 0929              Objective     Physical Exam:   Vital Signs   Temp:  [98.1 °F (36.7 °C)-98.4 °F (36.9 °C)] 98.3 °F (36.8 °C)  Heart Rate:  [55-92] 92  Resp:  [18] 18  BP: (106-130)/(63-77) 106/67    GENERAL: Awake and alert, in no acute distress.   HEENT: Oropharynx is clear. Hearing is grossly normal.   EYES: PERRL. No conjunctival injection. No lid lag.   LUNGS: Normal work of breathing.  SKIN: Warm and dry without cutaneous eruptions in exposed areas.  PSYCHIATRIC: Appropriate mood, affect, insight, and judgment.     Results Review:   I reviewed the patient's new clinical results.  I reviewed the patient's new imaging results and agree with the interpretation.  I reviewed the patient's other test results and agree with the interpretation    Lab Results   Component Value Date    WBC 2.19 (L) 02/12/2025    HGB 13.6 02/12/2025    HCT  "40.7 02/12/2025    MCV 87.9 02/12/2025     02/12/2025       No results found for: \"VANCOPEAK\", \"VANCOTROUGH\", \"VANCORANDOM\"    Lab Results   Component Value Date    GLUCOSE 85 02/12/2025    BUN 14 02/12/2025    CREATININE 0.78 02/12/2025    EGFRIFNONA 66 06/14/2021    EGFRIFAFRI 80 06/14/2021    BCR 17.9 02/12/2025    CO2 26.0 02/12/2025    CALCIUM 9.2 02/12/2025    ALBUMIN 3.8 02/09/2025    LABIL2 2.1 06/22/2023    AST 44 (H) 02/09/2025    ALT 27 02/09/2025         Estimated Creatinine Clearance: 57.8 mL/min (by C-G formula based on SCr of 0.78 mg/dL).    Isolation:   Droplet      Microbiology:  Microbiology Results (last 10 days)       Procedure Component Value - Date/Time    Respiratory Culture - Sputum, Cough [[id number]] Collected: 02/10/25 0639    Lab Status: Final result Specimen: Sputum from Cough Updated: 02/10/25 1510     Respiratory Culture Rejected     Gram Stain Rare (1+) WBCs seen      Occasional Epithelial cells seen      No organisms seen    Narrative:      Specimen rejected due to oropharyngeal contamination. Please reorder and recollect specimen if clinically necessary.    S. Pneumo Ag Urine or CSF - Urine, Urine, Clean Catch [[id number]]  (Normal) Collected: 02/09/25 1611    Lab Status: Final result Specimen: Urine, Clean Catch Updated: 02/09/25 1633     Strep Pneumo Ag Negative    Legionella Antigen, Urine - Urine, Urine, Clean Catch [[id number]]  (Normal) Collected: 02/09/25 1611    Lab Status: Final result Specimen: Urine, Clean Catch Updated: 02/09/25 1634     LEGIONELLA ANTIGEN, URINE Negative    Blood Culture - Blood, Arm, Left [[id number]]  (Normal) Collected: 02/09/25 0835    Lab Status: Preliminary result Specimen: Blood from Arm, Left Updated: 02/11/25 0845     Blood Culture No growth at 2 days    Blood Culture - Blood, Arm, Right [[id number]]  (Normal) Collected: 02/09/25 0812    Lab Status: Preliminary result Specimen: Blood from Arm, Right Updated: 02/11/25 0830     Blood Culture No " growth at 2 days    Respiratory Panel PCR w/COVID-19(SARS-CoV-2) DESTINI/DAXA/MARTHA/PAD/COR/RICKY In-House, NP Swab in UTM/VTM, 2 HR TAT - Swab, Nasopharynx [[id number]]  (Abnormal) Collected: 02/09/25 0743    Lab Status: Final result Specimen: Swab from Nasopharynx Updated: 02/09/25 0913     ADENOVIRUS, PCR Not Detected     Coronavirus 229E Not Detected     Coronavirus HKU1 Not Detected     Coronavirus NL63 Not Detected     Coronavirus OC43 Not Detected     COVID19 Not Detected     Human Metapneumovirus Not Detected     Human Rhinovirus/Enterovirus Not Detected     Influenza A H1 2009 PCR Detected     Influenza B PCR Not Detected     Parainfluenza Virus 1 Not Detected     Parainfluenza Virus 2 Not Detected     Parainfluenza Virus 3 Not Detected     Parainfluenza Virus 4 Not Detected     RSV, PCR Not Detected     Bordetella pertussis pcr Not Detected     Bordetella parapertussis PCR Not Detected     Chlamydophila pneumoniae PCR Not Detected     Mycoplasma pneumo by PCR Not Detected    Narrative:      In the setting of a positive respiratory panel with a viral infection PLUS a negative procalcitonin without other underlying concern for bacterial infection, consider observing off antibiotics or discontinuation of antibiotics and continue supportive care. If the respiratory panel is positive for atypical bacterial infection (Bordetella pertussis, Chlamydophila pneumoniae, or Mycoplasma pneumoniae), consider antibiotic de-escalation to target atypical bacterial infection.             Radiology:  2/9 chest x-ray reviewed by me with left lower infiltrate.    Assessment     #Influenza infection  #Neutropenia, improving  #Left lower lobe pneumonia  #Acute hypoxic respiratory failure, resolved    Neutropenia improved today.  Patient has remained afebrile with initial normal procalcitonin which speaks against bacterial pneumonia.  Suspect this is viral pneumonia and likely contributing to her neutropenia.    Continue Tamiflu 75 mg  twice daily for 5-day course for treatment of influenza A.  Patient received 3 days of ceftriaxone.  Doubt bacterial pneumonia and agree with stopping ceftriaxone.      Thank you for allowing me to be involved in the care of this patient. Infectious diseases will sign off at this time with antibiotics plan in place, but please call me at [phone] if any further ID questions or new ID concerns.

## 2025-02-13 ENCOUNTER — TRANSITIONAL CARE MANAGEMENT TELEPHONE ENCOUNTER (OUTPATIENT)
Dept: CALL CENTER | Facility: HOSPITAL | Age: 70
End: 2025-02-13
Payer: MEDICARE

## 2025-02-13 LAB — REF LAB TEST METHOD: NORMAL

## 2025-02-13 NOTE — OUTREACH NOTE
Call Center TCM Note      Flowsheet Row Responses   [facility] patient discharged from? [place]   Does the patient have one of the following disease processes/diagnoses(primary or secondary)? Pneumonia   TCM attempt successful? Yes  [VR- spouse]   Call start time 0926   Call end time 0928   Discharge diagnosis Pneumonia   Meds reviewed with patient/caregiver? Yes   Is the patient having any side effects they believe may be caused by any medication additions or changes? No   Does the patient have all medications ordered at discharge? Yes   Is the patient taking all medications as directed (includes completed medication regime)? Yes   Comments HOSP DC FU appt 2/25/25 2 pm.   Does the patient have an appointment with their PCP within 7-14 days of discharge? Yes   Has home health visited the patient within 72 hours of discharge? N/A   Pulse Ox monitoring None   Psychosocial issues? No   Did the patient receive a copy of their discharge instructions? Yes   Nursing interventions Reviewed instructions with patient   What is the patient's perception of their health status since discharge? Improving   Nursing Interventions Nurse provided patient education   If the patient is a current smoker, are they able to teach back resources for cessation? Not a smoker   Is the patient/caregiver able to teach back the hierarchy of who to call/visit for symptoms/problems? PCP, Specialist, Home health nurse, Urgent Care, ED, 911 Yes   Is the patient/caregiver able to teach back signs and symptoms of worsening condition: Fever/chills, Shortness of breath, Chest pain   TCM call completed? Yes   Wrap up additional comments Pt reports improvement. No needs.   Call end time 0928            Emily Hawkins RN    2/13/2025, 09:28 EST

## 2025-02-13 NOTE — OUTREACH NOTE
Prep Survey      Flowsheet Row Responses   [facility] patient discharged from? [place]   Is LACE score < 7 ? Yes   Eligibility [facility]   Date of Admission 02/09/25   Date of Discharge 02/12/25   Discharge Disposition Home or Self Care   Discharge diagnosis Pneumonia   Does the patient have one of the following disease processes/diagnoses(primary or secondary)? Pneumonia   Does the patient have Home health ordered? No   Is there a DME ordered? No   Prep survey completed? Yes            AMARILYS GALVAN - Registered Nurse

## 2025-02-14 LAB
BACTERIA SPEC AEROBE CULT: NORMAL
BACTERIA SPEC AEROBE CULT: NORMAL

## 2025-02-25 ENCOUNTER — OFFICE VISIT (OUTPATIENT)
Dept: INTERNAL MEDICINE | Facility: CLINIC | Age: 70
End: 2025-02-25
Payer: MEDICARE

## 2025-02-25 VITALS
HEART RATE: 114 BPM | WEIGHT: 138 LBS | DIASTOLIC BLOOD PRESSURE: 72 MMHG | SYSTOLIC BLOOD PRESSURE: 128 MMHG | OXYGEN SATURATION: 98 % | BODY MASS INDEX: 25.24 KG/M2

## 2025-02-25 DIAGNOSIS — J10.1 INFLUENZA A: ICD-10-CM

## 2025-02-25 DIAGNOSIS — J18.9 PNEUMONIA OF LEFT LOWER LOBE DUE TO INFECTIOUS ORGANISM: Primary | ICD-10-CM

## 2025-02-25 PROCEDURE — 99495 TRANSJ CARE MGMT MOD F2F 14D: CPT | Performed by: FAMILY MEDICINE

## 2025-02-25 PROCEDURE — 1111F DSCHRG MED/CURRENT MED MERGE: CPT | Performed by: FAMILY MEDICINE

## 2025-02-25 PROCEDURE — 1126F AMNT PAIN NOTED NONE PRSNT: CPT | Performed by: FAMILY MEDICINE

## 2025-02-25 NOTE — PROGRESS NOTES
"Chief Complaint  Hospital Follow Up Visit    Subjective        Page B Bill presents to [facility]  History of Present Illness  Transitional Care Management Certification  I certify that the following are true:  Communication was made within 2 business days of discharge.  Complexity of Medical Decision Making is moderate.  Face to face visit occurred within 13 days.    *Note: 60571 is for high complexity patients with a face to face visit within 7 days of discharge.  96998 is for high complexity patients with a face to face on days 8-14 post discharge or medium complexity with face to face visit within 14 days post discharge.       History of Present Illness  The patient is a 69-year-old female who presents for evaluation of left lower lobe pneumonia.    She reports an improvement in her condition following a recent hospitalization from 02/09/2025 to 02/12/2025, during which she was treated for influenza A with Tamiflu, antibiotics, and nebulizers. She was gradually weaned off oxygen, and her antibiotic regimen was adjusted. During her hospital stay, she experienced a significant decrease in her white blood cell count, which subsequently normalized, allowing for her discharge. She has a scheduled appointment with a hematologist tomorrow due to the low white blood cell count, which is suspected to be a side effect of doxycycline. Her appetite remains suboptimal, but she is able to consume food. She also has a follow-up appointment with Dr. Saavedra in a month.    MEDICATIONS  Current: doxycycline    Objective   Vital Signs:  /72 (BP Location: Left arm, Patient Position: Sitting, Cuff Size: Adult)   Pulse 114   Wt 62.6 kg (138 lb)   SpO2 98%   BMI 25.24 kg/m²   Estimated body mass index is 25.24 kg/m² as calculated from the following:    Height as of 2/9/25: 157.5 cm (62\").    Weight as of this encounter: 62.6 kg (138 lb).            Physical Exam  Vitals reviewed. "   Constitutional:       Appearance: She is well-developed.   HENT:      Head: Normocephalic and atraumatic.      Right Ear: Tympanic membrane and external ear normal.      Left Ear: Tympanic membrane and external ear normal.      Nose: Nose normal.   Eyes:      Conjunctiva/sclera: Conjunctivae normal.      Pupils: Pupils are equal, round, and reactive to light.   Neck:      Thyroid: No thyromegaly.      Vascular: No JVD.   Cardiovascular:      Rate and Rhythm: Normal rate and regular rhythm.      Heart sounds: Normal heart sounds.   Pulmonary:      Effort: Pulmonary effort is normal.      Breath sounds: Normal breath sounds.   Abdominal:      General: Bowel sounds are normal.      Palpations: Abdomen is soft.   Musculoskeletal:         General: Normal range of motion.      Cervical back: Normal range of motion and neck supple.   Lymphadenopathy:      Cervical: No cervical adenopathy.   Skin:     General: Skin is warm and dry.      Findings: No rash.   Neurological:      Mental Status: She is alert and oriented to person, place, and time.      Cranial Nerves: No cranial nerve deficit.      Coordination: Coordination normal.   Psychiatric:         Behavior: Behavior normal.         Thought Content: Thought content normal.         Judgment: Judgment normal.        Physical Exam  Lungs were auscultated.    Result Review :  The following data was reviewed by: Claudio Bejarano MD on 02/25/2025:  Common labs          2/10/2025    06:11 2/10/2025    17:10 2/11/2025    04:55 2/12/2025    06:03   Common Labs   Glucose 95   134  85    BUN 8   10  14    Creatinine 0.74   0.65  0.78    Sodium 137   136  139    Potassium 3.6  4.3  4.5  3.8    Chloride 100   101  103    Calcium 8.7   9.0  9.2    WBC 1.87   0.71  2.19    Hemoglobin 12.6   12.8  13.6    Hematocrit 39.1   38.0  40.7    Platelets 197   193  255      Data reviewed : Recent hospitalization notes [facility]    Results  Laboratory Studies  White blood cell  count was 710.             Assessment and Plan   There are no diagnoses linked to this encounter.  Assessment & Plan  1. Left lower lobe pneumonia.  She was admitted to the hospital on 02/09/2025 and discharged on 02/12/2025. During her stay, she was treated with Tamiflu, antibiotics, and nebulizers, and was weaned off oxygen. Her white blood cell count dropped significantly to 710 but returned to normal the next day. The findings were nonspecific and may be seen in a reactive process, particularly a viral infection. A follow-up chest x-ray has been ordered to be conducted in April 2025, approximately 2 months after the initial diagnosis on 02/09/2025. She is advised to monitor her symptoms and report any worsening.    2. Low white blood cell count.  Her white blood cell count dropped significantly during her hospital stay, likely due to doxycycline. It returned to normal the next day. She has an appointment with the hematology lab and a nurse tomorrow to further evaluate her white blood cell count. The results should be available quickly as the lab is on-site.         Follow Up   No follow-ups on file.    Patient or patient representative verbalized consent for the use of Ambient Listening during the visit with  Claudio Bejarano MD for chart documentation. 2/25/2025  18:22 EST    Patient was given instructions and counseling regarding her condition or for health maintenance advice. Please see specific information pulled into the AVS if appropriate.

## 2025-02-26 ENCOUNTER — CLINICAL SUPPORT (OUTPATIENT)
Dept: ONCOLOGY | Facility: HOSPITAL | Age: 70
End: 2025-02-26
Payer: MEDICARE

## 2025-02-26 ENCOUNTER — LAB (OUTPATIENT)
Dept: LAB | Facility: HOSPITAL | Age: 70
End: 2025-02-26
Payer: MEDICARE

## 2025-02-26 DIAGNOSIS — D70.9 NEUTROPENIA, UNSPECIFIED TYPE: ICD-10-CM

## 2025-02-26 LAB
BASOPHILS # BLD AUTO: 0.02 10*3/MM3 (ref 0–0.2)
BASOPHILS NFR BLD AUTO: 0.4 % (ref 0–1.5)
DEPRECATED RDW RBC AUTO: 44.1 FL (ref 37–54)
EOSINOPHIL # BLD AUTO: 0.05 10*3/MM3 (ref 0–0.4)
EOSINOPHIL NFR BLD AUTO: 0.9 % (ref 0.3–6.2)
ERYTHROCYTE [DISTWIDTH] IN BLOOD BY AUTOMATED COUNT: 13.3 % (ref 12.3–15.4)
HCT VFR BLD AUTO: 40.5 % (ref 34–46.6)
HGB BLD-MCNC: 12.9 G/DL (ref 12–15.9)
IMM GRANULOCYTES # BLD AUTO: 0.01 10*3/MM3 (ref 0–0.05)
IMM GRANULOCYTES NFR BLD AUTO: 0.2 % (ref 0–0.5)
LYMPHOCYTES # BLD AUTO: 1.1 10*3/MM3 (ref 0.7–3.1)
LYMPHOCYTES NFR BLD AUTO: 20.6 % (ref 19.6–45.3)
MCH RBC QN AUTO: 29.1 PG (ref 26.6–33)
MCHC RBC AUTO-ENTMCNC: 31.9 G/DL (ref 31.5–35.7)
MCV RBC AUTO: 91.4 FL (ref 79–97)
MONOCYTES # BLD AUTO: 0.52 10*3/MM3 (ref 0.1–0.9)
MONOCYTES NFR BLD AUTO: 9.8 % (ref 5–12)
NEUTROPHILS NFR BLD AUTO: 3.63 10*3/MM3 (ref 1.7–7)
NEUTROPHILS NFR BLD AUTO: 68.1 % (ref 42.7–76)
NRBC BLD AUTO-RTO: 0 /100 WBC (ref 0–0.2)
PLATELET # BLD AUTO: 445 10*3/MM3 (ref 140–450)
PMV BLD AUTO: 8.7 FL (ref 6–12)
RBC # BLD AUTO: 4.43 10*6/MM3 (ref 3.77–5.28)
WBC NRBC COR # BLD AUTO: 5.33 10*3/MM3 (ref 3.4–10.8)

## 2025-02-26 PROCEDURE — 85025 COMPLETE CBC W/AUTO DIFF WBC: CPT

## 2025-02-26 PROCEDURE — 36415 COLL VENOUS BLD VENIPUNCTURE: CPT

## 2025-02-26 NOTE — PROGRESS NOTES
Patient contacted via telephone for RN Review. CBC reviewed, counts are stable for this patient at this time. Patient has no complaints. Follow up appointment reviewed. Patient is instructed to call the office with any concerns or new symptoms prior to next visit. Patient verbalized understanding.    Lab Results   Component Value Date    WBC 5.33 02/26/2025    HGB 12.9 02/26/2025    HCT 40.5 02/26/2025    MCV 91.4 02/26/2025     02/26/2025

## 2025-03-25 ENCOUNTER — OFFICE VISIT (OUTPATIENT)
Dept: ONCOLOGY | Facility: CLINIC | Age: 70
End: 2025-03-25
Payer: MEDICARE

## 2025-03-25 ENCOUNTER — LAB (OUTPATIENT)
Dept: LAB | Facility: HOSPITAL | Age: 70
End: 2025-03-25
Payer: MEDICARE

## 2025-03-25 VITALS
HEIGHT: 62 IN | BODY MASS INDEX: 26.59 KG/M2 | HEART RATE: 91 BPM | OXYGEN SATURATION: 97 % | DIASTOLIC BLOOD PRESSURE: 86 MMHG | TEMPERATURE: 97.7 F | RESPIRATION RATE: 17 BRPM | WEIGHT: 144.5 LBS | SYSTOLIC BLOOD PRESSURE: 139 MMHG

## 2025-03-25 DIAGNOSIS — D70.3 NEUTROPENIA ASSOCIATED WITH INFECTION: Primary | ICD-10-CM

## 2025-03-25 DIAGNOSIS — D70.9 NEUTROPENIA, UNSPECIFIED TYPE: ICD-10-CM

## 2025-03-25 LAB
BASOPHILS # BLD AUTO: 0.03 10*3/MM3 (ref 0–0.2)
BASOPHILS NFR BLD AUTO: 0.5 % (ref 0–1.5)
DEPRECATED RDW RBC AUTO: 48.2 FL (ref 37–54)
EOSINOPHIL # BLD AUTO: 0.05 10*3/MM3 (ref 0–0.4)
EOSINOPHIL NFR BLD AUTO: 0.8 % (ref 0.3–6.2)
ERYTHROCYTE [DISTWIDTH] IN BLOOD BY AUTOMATED COUNT: 14.1 % (ref 12.3–15.4)
HCT VFR BLD AUTO: 40.8 % (ref 34–46.6)
HGB BLD-MCNC: 12.9 G/DL (ref 12–15.9)
IMM GRANULOCYTES # BLD AUTO: 0.01 10*3/MM3 (ref 0–0.05)
IMM GRANULOCYTES NFR BLD AUTO: 0.2 % (ref 0–0.5)
LYMPHOCYTES # BLD AUTO: 1.36 10*3/MM3 (ref 0.7–3.1)
LYMPHOCYTES NFR BLD AUTO: 20.4 % (ref 19.6–45.3)
MCH RBC QN AUTO: 29.2 PG (ref 26.6–33)
MCHC RBC AUTO-ENTMCNC: 31.6 G/DL (ref 31.5–35.7)
MCV RBC AUTO: 92.3 FL (ref 79–97)
MONOCYTES # BLD AUTO: 0.46 10*3/MM3 (ref 0.1–0.9)
MONOCYTES NFR BLD AUTO: 6.9 % (ref 5–12)
NEUTROPHILS NFR BLD AUTO: 4.75 10*3/MM3 (ref 1.7–7)
NEUTROPHILS NFR BLD AUTO: 71.2 % (ref 42.7–76)
NRBC BLD AUTO-RTO: 0 /100 WBC (ref 0–0.2)
PLATELET # BLD AUTO: 285 10*3/MM3 (ref 140–450)
PMV BLD AUTO: 9.5 FL (ref 6–12)
RBC # BLD AUTO: 4.42 10*6/MM3 (ref 3.77–5.28)
WBC NRBC COR # BLD AUTO: 6.66 10*3/MM3 (ref 3.4–10.8)

## 2025-03-25 PROCEDURE — 1160F RVW MEDS BY RX/DR IN RCRD: CPT | Performed by: INTERNAL MEDICINE

## 2025-03-25 PROCEDURE — 99213 OFFICE O/P EST LOW 20 MIN: CPT | Performed by: INTERNAL MEDICINE

## 2025-03-25 PROCEDURE — 85025 COMPLETE CBC W/AUTO DIFF WBC: CPT

## 2025-03-25 PROCEDURE — 1126F AMNT PAIN NOTED NONE PRSNT: CPT | Performed by: INTERNAL MEDICINE

## 2025-03-25 PROCEDURE — 36415 COLL VENOUS BLD VENIPUNCTURE: CPT

## 2025-03-25 PROCEDURE — 1159F MED LIST DOCD IN RCRD: CPT | Performed by: INTERNAL MEDICINE

## 2025-03-25 NOTE — PROGRESS NOTES
"Chief Complaint  Viral induced leukopenia    Subjective        History of Present Illness    Patient returns today in follow-up after being seen in consultation during hospitalization in early February 2025.  Patient was admitted 2/9 - 2/12/2025 with left lower lobe pneumonia and influenza A H1 infection.  Her counts had previously been normal.  On admission 2/9/2025 WBC was 2.37 and declined to a low of 0.71 on 2/11/2025.  WBC improved to 2.19 at discharge on 2/12/2025.  Patient did have repeat CBC on 2/26/2025 with WBC up to normal at 5.33 with normal differential.  She did have peripheral blood flow cytometry performed on 2/12/2025 with identification of a 2.5% population of polyclonal plasmacytic cells that were felt to be nonspecific and reactive in nature related to her viral infection.  Patient notes that she recovered fully from that episode and has had no difficulty since then.      Objective   Vital Signs:   /86   Pulse 91   Temp 97.7 °F (36.5 °C) (Oral)   Resp 17   Ht 157.5 cm (62.01\")   Wt 65.5 kg (144 lb 8 oz)   SpO2 97%   BMI 26.42 kg/m²     Physical Exam  Constitutional:       Appearance: She is well-developed.   Eyes:      Conjunctiva/sclera: Conjunctivae normal.   Neck:      Thyroid: No thyromegaly.   Cardiovascular:      Rate and Rhythm: Normal rate and regular rhythm.      Heart sounds: No murmur heard.     No friction rub. No gallop.   Pulmonary:      Effort: No respiratory distress.      Breath sounds: Normal breath sounds.   Abdominal:      General: Bowel sounds are normal. There is no distension.      Palpations: Abdomen is soft.      Tenderness: There is no abdominal tenderness.   Lymphadenopathy:      Head:      Right side of head: No submandibular adenopathy.      Cervical: No cervical adenopathy.      Upper Body:      Right upper body: No supraclavicular adenopathy.      Left upper body: No supraclavicular adenopathy.   Skin:     General: Skin is warm and dry.      Findings: " No rash.   Neurological:      Mental Status: She is alert and oriented to person, place, and time.      Cranial Nerves: No cranial nerve deficit.      Motor: No abnormal muscle tone.      Deep Tendon Reflexes: Reflexes normal.   Psychiatric:         Behavior: Behavior normal.        Result Review : Reviewed CBC from today       Assessment and Plan     *Left lower lobe pneumonia, influenza  Patient was admitted on 2/9/2025 with pneumonia.    Chest x-ray showed infiltrate in the left base on 2/9/2025.    Respiratory viral panel was positive for influenza A H1.    Blood cultures negative.  Respiratory culture negative.    Patient received empiric Rocephin, subsequently discontinued due to concern regarding effects on WBC.    Patient also received Tamiflu 75 mg every 12 hours.    Patient received prednisone 40 mg daily.  Infectious disease consulted, did not recommend any further antibiotic coverage  Patient fully recovered from the episode following discharge on 2/12/2025.     *Transient acute leukopenia secondary to influenza  Previous labs showed normal CBC results.    Most recent labs prior to admission from 7/1/2024 with WBC 6.4 with normal differential, hemoglobin 14.2, platelet count 285,000.    On admission 2/9/2025, WBC 2.37 with differential of 65 segs, 19 lymphs, 14 monocytes with ANC 1.55.    On 2/10/2025, WBC declined to 1.87, no differential performed.    On 2/11/2025, WBC declined to 0.71, no differential performed.    Patient maintained normal hemoglobin and platelet count.  Most likely the patient's acute leukopenia is related to viral infection with influenza.    Concern regarding the rapid decline and consideration as to whether ceftriaxone could be worsening the WBC, ceftriaxone discontinued.    Additional labs on 2/12/2025 with B12 greater than 2000, folate greater than 20  Peripheral blood flow cytometry on 2/12/2025 with 2.5% population of polyclonal plasmacytic cells felt to be nonspecific and  reactive in nature related to viral infection  Subsequent normalization of WBC on 2/26/2025 at 5.33 with normal differential  Patient returns today in follow-up with again normal WBC at 6.66 with normal differential.  Hemoglobin is normal at 12.9 and platelet count normal at 285,000.  This was all related to her previous viral infection.  We did discuss the possibility of repeating peripheral blood flow cytometry however both the patient and I agree that this is not necessary.  She will not need further follow-up in our office.    Plan:  Patient with previous transient acute leukopenia/neutropenia related to influenza infection that has now fully recovered  No need for any further hematology follow-up.

## 2025-03-25 NOTE — LETTER
"March 25, 2025     Claudio Bejarano MD  [address] [facility]  [address]  [facility] [address]    Patient: Pebbles Khan   YOB: 1955   Date of Visit: 3/25/2025     Dear Claudio Bejarano MD:       Thank you for referring Pebbles Khan to me for evaluation. Below are the relevant portions of my assessment and plan of care.    If you have questions, please do not hesitate to call me. I look forward to following Page along with you.         Sincerely,        Darrell Saavedra MD        CC: No Recipients    Darrell Saavedra Jr., MD  03/25/25 1400  Sign when Signing Visit  Chief Complaint  Viral induced leukopenia    Subjective       History of Present Illness    Patient returns today in follow-up after being seen in consultation during hospitalization in early February 2025.  Patient was admitted 2/9 - 2/12/2025 with left lower lobe pneumonia and influenza A H1 infection.  Her counts had previously been normal.  On admission 2/9/2025 WBC was 2.37 and declined to a low of 0.71 on 2/11/2025.  WBC improved to 2.19 at discharge on 2/12/2025.  Patient did have repeat CBC on 2/26/2025 with WBC up to normal at 5.33 with normal differential.  She did have peripheral blood flow cytometry performed on 2/12/2025 with identification of a 2.5% population of polyclonal plasmacytic cells that were felt to be nonspecific and reactive in nature related to her viral infection.  Patient notes that she recovered fully from that episode and has had no difficulty since then.      Objective  Vital Signs:   /86   Pulse 91   Temp 97.7 °F (36.5 °C) (Oral)   Resp 17   Ht 157.5 cm (62.01\")   Wt 65.5 kg (144 lb 8 oz)   SpO2 97%   BMI 26.42 kg/m²     Physical Exam  Constitutional:       Appearance: She is well-developed.   Eyes:      Conjunctiva/sclera: Conjunctivae normal.   Neck:      Thyroid: No thyromegaly.   Cardiovascular:      Rate and Rhythm: Normal rate and regular rhythm.      Heart sounds: No murmur heard.     No friction rub. " No gallop.   Pulmonary:      Effort: No respiratory distress.      Breath sounds: Normal breath sounds.   Abdominal:      General: Bowel sounds are normal. There is no distension.      Palpations: Abdomen is soft.      Tenderness: There is no abdominal tenderness.   Lymphadenopathy:      Head:      Right side of head: No submandibular adenopathy.      Cervical: No cervical adenopathy.      Upper Body:      Right upper body: No supraclavicular adenopathy.      Left upper body: No supraclavicular adenopathy.   Skin:     General: Skin is warm and dry.      Findings: No rash.   Neurological:      Mental Status: She is alert and oriented to person, place, and time.      Cranial Nerves: No cranial nerve deficit.      Motor: No abnormal muscle tone.      Deep Tendon Reflexes: Reflexes normal.   Psychiatric:         Behavior: Behavior normal.        Result Review: Reviewed CBC from today       Assessment and Plan     *Left lower lobe pneumonia, influenza  Patient was admitted on 2/9/2025 with pneumonia.    Chest x-ray showed infiltrate in the left base on 2/9/2025.    Respiratory viral panel was positive for influenza A H1.    Blood cultures negative.  Respiratory culture negative.    Patient received empiric Rocephin, subsequently discontinued due to concern regarding effects on WBC.    Patient also received Tamiflu 75 mg every 12 hours.    Patient received prednisone 40 mg daily.  Infectious disease consulted, did not recommend any further antibiotic coverage  Patient fully recovered from the episode following discharge on 2/12/2025.     *Transient acute leukopenia secondary to influenza  Previous labs showed normal CBC results.    Most recent labs prior to admission from 7/1/2024 with WBC 6.4 with normal differential, hemoglobin 14.2, platelet count 285,000.    On admission 2/9/2025, WBC 2.37 with differential of 65 segs, 19 lymphs, 14 monocytes with ANC 1.55.    On 2/10/2025, WBC declined to 1.87, no differential  performed.    On 2/11/2025, WBC declined to 0.71, no differential performed.    Patient maintained normal hemoglobin and platelet count.  Most likely the patient's acute leukopenia is related to viral infection with influenza.    Concern regarding the rapid decline and consideration as to whether ceftriaxone could be worsening the WBC, ceftriaxone discontinued.    Additional labs on 2/12/2025 with B12 greater than 2000, folate greater than 20  Peripheral blood flow cytometry on 2/12/2025 with 2.5% population of polyclonal plasmacytic cells felt to be nonspecific and reactive in nature related to viral infection  Subsequent normalization of WBC on 2/26/2025 at 5.33 with normal differential  Patient returns today in follow-up with again normal WBC at 6.66 with normal differential.  Hemoglobin is normal at 12.9 and platelet count normal at 285,000.  This was all related to her previous viral infection.  We did discuss the possibility of repeating peripheral blood flow cytometry however both the patient and I agree that this is not necessary.  She will not need further follow-up in our office.    Plan:  Patient with previous transient acute leukopenia/neutropenia related to influenza infection that has now fully recovered  No need for any further hematology follow-up.

## 2025-05-08 ENCOUNTER — APPOINTMENT (OUTPATIENT)
Dept: WOMENS IMAGING | Facility: HOSPITAL | Age: 70
End: 2025-05-08
Payer: MEDICARE

## 2025-05-08 PROCEDURE — 77063 BREAST TOMOSYNTHESIS BI: CPT | Performed by: RADIOLOGY

## 2025-05-08 PROCEDURE — 77067 SCR MAMMO BI INCL CAD: CPT | Performed by: RADIOLOGY

## 2025-06-05 DIAGNOSIS — E78.2 MIXED HYPERLIPIDEMIA: ICD-10-CM

## 2025-06-05 RX ORDER — SIMVASTATIN 40 MG
40 TABLET ORAL
Qty: 90 TABLET | Refills: 1 | Status: SHIPPED | OUTPATIENT
Start: 2025-06-05

## 2025-07-07 ENCOUNTER — OFFICE VISIT (OUTPATIENT)
Dept: INTERNAL MEDICINE | Facility: CLINIC | Age: 70
End: 2025-07-07
Payer: MEDICARE

## 2025-07-07 VITALS
HEART RATE: 62 BPM | DIASTOLIC BLOOD PRESSURE: 72 MMHG | BODY MASS INDEX: 25.6 KG/M2 | SYSTOLIC BLOOD PRESSURE: 134 MMHG | OXYGEN SATURATION: 97 % | WEIGHT: 140 LBS

## 2025-07-07 DIAGNOSIS — Z00.00 MEDICARE ANNUAL WELLNESS VISIT, SUBSEQUENT: Primary | ICD-10-CM

## 2025-07-07 DIAGNOSIS — K21.00 GASTROESOPHAGEAL REFLUX DISEASE WITH ESOPHAGITIS, UNSPECIFIED WHETHER HEMORRHAGE: ICD-10-CM

## 2025-07-07 DIAGNOSIS — E78.2 MIXED HYPERLIPIDEMIA: ICD-10-CM

## 2025-07-07 DIAGNOSIS — E53.8 B12 DEFICIENCY: ICD-10-CM

## 2025-07-07 DIAGNOSIS — G43.009 MIGRAINE WITHOUT AURA AND WITHOUT STATUS MIGRAINOSUS, NOT INTRACTABLE: ICD-10-CM

## 2025-07-07 DIAGNOSIS — R00.2 PALPITATIONS: ICD-10-CM

## 2025-07-07 DIAGNOSIS — R73.09 ELEVATED GLUCOSE: ICD-10-CM

## 2025-07-07 DIAGNOSIS — E55.9 HYPOVITAMINOSIS D: ICD-10-CM

## 2025-07-07 DIAGNOSIS — D70.3 NEUTROPENIA ASSOCIATED WITH INFECTION: ICD-10-CM

## 2025-07-07 DIAGNOSIS — I35.1 NONRHEUMATIC AORTIC VALVE INSUFFICIENCY: ICD-10-CM

## 2025-07-07 DIAGNOSIS — Z91.09 ALLERGY TO ENVIRONMENTAL FACTORS: ICD-10-CM

## 2025-07-07 PROCEDURE — G0439 PPPS, SUBSEQ VISIT: HCPCS | Performed by: FAMILY MEDICINE

## 2025-07-07 PROCEDURE — 1160F RVW MEDS BY RX/DR IN RCRD: CPT | Performed by: FAMILY MEDICINE

## 2025-07-07 PROCEDURE — 1126F AMNT PAIN NOTED NONE PRSNT: CPT | Performed by: FAMILY MEDICINE

## 2025-07-07 PROCEDURE — 1159F MED LIST DOCD IN RCRD: CPT | Performed by: FAMILY MEDICINE

## 2025-07-07 PROCEDURE — G2211 COMPLEX E/M VISIT ADD ON: HCPCS | Performed by: FAMILY MEDICINE

## 2025-07-07 PROCEDURE — 99214 OFFICE O/P EST MOD 30 MIN: CPT | Performed by: FAMILY MEDICINE

## 2025-07-07 RX ORDER — MONTELUKAST SODIUM 10 MG/1
10 TABLET ORAL NIGHTLY
Qty: 90 TABLET | Refills: 3 | Status: SHIPPED | OUTPATIENT
Start: 2025-07-07

## 2025-07-07 RX ORDER — CETIRIZINE HYDROCHLORIDE 10 MG/1
10 TABLET ORAL DAILY
COMMUNITY

## 2025-07-07 RX ORDER — SIMVASTATIN 40 MG
40 TABLET ORAL
Qty: 90 TABLET | Refills: 1 | Status: SHIPPED | OUTPATIENT
Start: 2025-07-07

## 2025-07-07 NOTE — PROGRESS NOTES
Subjective   The ABCs of the Annual Wellness Visit  Medicare Wellness Visit      Page B Bill is a 69 y.o. patient who presents for a Medicare Wellness Visit.    The following portions of the patient's history were reviewed and   updated as appropriate: allergies, current medications, past family history, past medical history, past social history, past surgical history, and problem list.    Compared to one year ago, the patient's physical   health is the same.  Compared to one year ago, the patient's mental   health is the same.    Recent Hospitalizations:  This patient has had a [facility] admission record on file within the last 365 days.  Current Medical Providers:  Patient Care Team:  Claudio Bejarano MD as PCP - General (Family Medicine)  Mikael Steiner MD as Consulting Physician (Gastroenterology)  Abe Horton MD as Referring Physician (Dermatology)    Outpatient Medications Prior to Visit   Medication Sig Dispense Refill    cetirizine (zyrTEC) 10 MG tablet Take 1 tablet by mouth Daily.      lansoprazole (PREVACID) 15 MG capsule Take 1 capsule by mouth Daily.      melatonin 5 MG tablet tablet Take 1 tablet by mouth Every Night.      multivitamin with minerals tablet tablet Take 1 tablet by mouth Daily.      polyethyl glycol-propyl glycol (SYSTANE) 0.4-0.3 % solution ophthalmic solution 2 drops Every Morning.      Saccharomyces boulardii (FLORASTOR PO) Take  by mouth 2 (Two) Times a Day.      SUMAtriptan (IMITREX) 100 MG tablet Take 1 tablet by mouth Every 2 (Two) Hours As Needed for Migraine. Take one tablet at onset of headache. May repeat dose one time in 2 hours if headache not relieved. 9 tablet 4    vitamin B-12 (CYANOCOBALAMIN) 1000 MCG tablet TAKE 1 TABLET BY MOUTH EVERY  tablet 3    simvastatin (ZOCOR) 40 MG tablet TAKE 1 TABLET BY MOUTH EVERY NIGHT AT BEDTIME 90 tablet 1     No facility-administered medications prior to visit.     No opioid medication identified on active  "medication list. I have reviewed chart for other potential  high risk medication/s and harmful drug interactions in the elderly.      Aspirin is not on active medication list.  Aspirin use is not indicated based on review of current medical condition/s. Risk of harm outweighs potential benefits.  .    Patient Active Problem List   Diagnosis    Hyperlipidemia    Migraine    Gastroesophageal reflux disease with esophagitis    Hiatal hernia    Chronic midline low back pain without sciatica    Elevated blood pressure reading    Medicare annual wellness visit, subsequent    Hypovitaminosis D    Nonrheumatic aortic valve insufficiency    PAC (premature atrial contraction)    Pneumonia    Pneumonia due to influenza A virus    Neutropenia associated with infection    Allergy to environmental factors     Advance Care Planning Advance Directive is not on file.  ACP discussion was held with the patient during this visit. Patient does not have an advance directive, information provided.            Objective   Vitals:    07/07/25 0948   BP: 134/72   BP Location: Left arm   Patient Position: Sitting   Cuff Size: Large Adult   Pulse: 62   SpO2: 97%   Weight: 63.5 kg (140 lb)   PainSc: 0-No pain       Estimated body mass index is 25.6 kg/m² as calculated from the following:    Height as of 3/25/25: 157.5 cm (62.01\").    Weight as of this encounter: 63.5 kg (140 lb).    BMI is >= 25 and <30. (Overweight) The following options were offered after discussion;: exercise counseling/recommendations and nutrition counseling/recommendations           Does the patient have evidence of cognitive impairment? No                                                                                                Health  Risk Assessment    Smoking Status:  Social History     Tobacco Use   Smoking Status Never   Smokeless Tobacco Never     Alcohol Consumption:  Social History     Substance and Sexual Activity   Alcohol Use No       Fall Risk " Screen  STEADI Fall Risk Assessment was completed, and patient is at LOW risk for falls.Assessment completed on:2025    Depression Screening   Little interest or pleasure in doing things? Not at all   Feeling down, depressed, or hopeless? Not at all   PHQ-2 Total Score 0      Health Habits and Functional and Cognitive Screenin/30/2025    11:05 AM   Functional & Cognitive Status   Do you have difficulty preparing food and eating? No   Do you have difficulty bathing yourself, getting dressed or grooming yourself? No   Do you have difficulty using the toilet? No   Do you have difficulty moving around from place to place? No   Do you have trouble with steps or getting out of a bed or a chair? No   Current Diet Well Balanced Diet   Dental Exam Up to date   Eye Exam Up to date   Exercise (times per week) 6 times per week   Current Exercises Include Walking   Do you need help using the phone?  No   Are you deaf or do you have serious difficulty hearing?  No   Do you need help to go to places out of walking distance? No   Do you need help shopping? No   Do you need help preparing meals?  No   Do you need help with housework?  No   Do you need help with laundry? No   Do you need help taking your medications? No   Do you need help managing money? No   Do you ever drive or ride in a car without wearing a seat belt? No   Have you felt unusual fatigue (could be tiredness), stress, anger or loneliness in the last month? No   Who do you live with? Spouse   If you need help, do you have trouble finding someone available to you? No   Have you been bothered in the last four weeks by sexual problems? No   Do you have difficulty concentrating, remembering or making decisions? No           Age-appropriate Screening Schedule:  Refer to the list below for future screening recommendations based on patient's age, sex and/or medical conditions. Orders for these recommended tests are listed in the plan section. The patient has  been provided with a written plan.    Health Maintenance List  Health Maintenance   Topic Date Due    TDAP/TD VACCINES (1 - Tdap) Never done    Pneumococcal Vaccine 50+ (1 of 1 - PCV) Never done    ZOSTER VACCINE (1 of 2) Never done    HEPATITIS C SCREENING  Never done    DXA SCAN  08/21/2021    COVID-19 Vaccine (5 - 2024-25 season) 09/01/2024    LIPID PANEL  07/01/2025    INFLUENZA VACCINE  07/01/2025    ANNUAL WELLNESS VISIT  07/07/2026    MAMMOGRAM  05/08/2027    COLORECTAL CANCER SCREENING  02/23/2028                                                                                                                                                CMS Preventative Services Quick Reference  Risk Factors Identified During Encounter  None Identified    The above risks/problems have been discussed with the patient.  Pertinent information has been shared with the patient in the After Visit Summary.  An After Visit Summary and PPPS were made available to the patient.    Follow Up:   Next Medicare Wellness visit to be scheduled in 1 year.         Additional E&M Note during same encounter follows:  Patient has additional, significant, and separately identifiable condition(s)/problem(s) that require work above and beyond the Medicare Wellness Visit     Chief Complaint  Medicare Wellness-subsequent    Subjective      Current Outpatient Medications:   ·  cetirizine (zyrTEC) 10 MG tablet, Take 1 tablet by mouth Daily., Disp: , Rfl:   ·  lansoprazole (PREVACID) 15 MG capsule, Take 1 capsule by mouth Daily., Disp: , Rfl:   ·  melatonin 5 MG tablet tablet, Take 1 tablet by mouth Every Night., Disp: , Rfl:   ·  multivitamin with minerals tablet tablet, Take 1 tablet by mouth Daily., Disp: , Rfl:   ·  polyethyl glycol-propyl glycol (SYSTANE) 0.4-0.3 % solution ophthalmic solution, 2 drops Every Morning., Disp: , Rfl:   ·  Saccharomyces boulardii (FLORASTOR PO), Take  by mouth 2 (Two) Times a Day., Disp: , Rfl:   ·  simvastatin (ZOCOR)  40 MG tablet, TAKE 1 TABLET BY MOUTH EVERY NIGHT AT BEDTIME, Disp: 90 tablet, Rfl: 1  ·  SUMAtriptan (IMITREX) 100 MG tablet, Take 1 tablet by mouth Every 2 (Two) Hours As Needed for Migraine. Take one tablet at onset of headache. May repeat dose one time in 2 hours if headache not relieved., Disp: 9 tablet, Rfl: 4  ·  vitamin B-12 (CYANOCOBALAMIN) 1000 MCG tablet, TAKE 1 TABLET BY MOUTH EVERY DAY, Disp: 100 tablet, Rfl: 3  ·  montelukast (Singulair) 10 MG tablet, Take 1 tablet by mouth Every Night. For environmental allergy, Disp: 90 tablet, Rfl: 3       Peblbes is also being seen today for an annual adult preventative physical exam.  and Pebbles is also being seen today for additional medical problem/s.       The patient presents for evaluation of environmental allergies, palpitations, and health maintenance.    She continues to experience severe allergy attacks, which she believes are triggered by dust and ragweed. Despite her efforts to manage her allergies through over-the-counter medications, air purifiers, and maintaining a clean environment, her symptoms have been worsening with age. She has not yet tried Singulair. She also notes that her symptoms are more pronounced in the evening, leading her to walk in the morning instead.    She reports experiencing palpitations when lying on her left side at night, which is causing her some anxiety. She has not consulted a cardiologist recently but recalls wearing a heart monitor for a week in the past.    She has not had a DEXA scan recently and is due for one next year. She is currently taking over-the-counter lansoprazole for reflux, vitamins, melatonin, Florastor (a probiotic), simvastatin for cholesterol, sumatriptan for migraines, and a B12 supplement. She does not require any medication refills at this time.    She was hospitalized for pneumonia earlier this year, around 02/2025 or 03/2025. She has a history of colon resection due to an adenoma in 2018, performed by  Dr. Guzman. She also had a cholecystectomy in the past. She experiences migraines very rarely and is trying to avoid taking Imitrex. She has a long-standing history of a nervous stomach since childhood and has noticed that she can no longer tolerate beans or cucumbers as she ages.    PAST SURGICAL HISTORY:  - Colon resection due to adenoma in 2018  - Cholecystectomy  - Two Mohs surgeries and other dermatological procedures          Objective   Vital Signs:  /72 (BP Location: Left arm, Patient Position: Sitting, Cuff Size: Large Adult)   Pulse 62   Wt 63.5 kg (140 lb)   SpO2 97%   BMI 25.60 kg/m²   Physical Exam  Vitals reviewed.   Constitutional:       Appearance: She is well-developed.   HENT:      Head: Normocephalic and atraumatic.      Right Ear: Tympanic membrane and external ear normal.      Left Ear: Tympanic membrane and external ear normal.      Nose: Nose normal.   Eyes:      Conjunctiva/sclera: Conjunctivae normal.      Pupils: Pupils are equal, round, and reactive to light.   Neck:      Thyroid: No thyromegaly.      Vascular: No JVD.   Cardiovascular:      Rate and Rhythm: Normal rate and regular rhythm. Occasional Extrasystoles are present.     Heart sounds: Normal heart sounds.   Pulmonary:      Effort: Pulmonary effort is normal.      Breath sounds: Normal breath sounds.   Abdominal:      General: Bowel sounds are normal.      Palpations: Abdomen is soft.   Musculoskeletal:         General: Normal range of motion.      Cervical back: Normal range of motion and neck supple.   Lymphadenopathy:      Cervical: No cervical adenopathy.   Skin:     General: Skin is warm and dry.      Findings: No rash.   Neurological:      Mental Status: She is alert and oriented to person, place, and time.      Cranial Nerves: No cranial nerve deficit.      Coordination: Coordination normal.   Psychiatric:         Behavior: Behavior normal.         Thought Content: Thought content normal.         Judgment:  Judgment normal.           Cardiovascular: Regular rhythm with an occasional extra heartbeat.  Other: Blood pressure is normal.    The following data was reviewed by: Claudio Bejarano MD on 07/07/2025:  Data reviewed : Recent hospitalization notes reviewed admission [facility] pneumonia  Common labs          2/12/2025    06:03 2/26/2025    12:35 3/25/2025    13:29   Common Labs   Glucose 85      BUN 14      Creatinine 0.78      Sodium 139      Potassium 3.8      Chloride 103      Calcium 9.2      WBC 2.19  5.33  6.66    Hemoglobin 13.6  12.9  12.9    Hematocrit 40.7  40.5  40.8    Platelets 255  445  285        Results  Labs   - Blood count: 03/2025, Normal   - B12 and folic acid levels: Sufficient   - Respiratory culture: Positive for influenza A, H1 influenza A    Imaging   - Mammogram: Negative           Assessment and Plan Additional age appropriate preventative wellness advice topics were discussed during today's preventative wellness exam(some topics already addressed during AWV portion of the note above):   Nutrition: Discussed nutrition plan with patient. Information shared in after visit summary. Goal is for a well balanced diet to enhance overall health.     Healthy Weight: Discussed current and goal BMI with patient. Steps to attain this goal discussed. Information shared in after visit summary.       1. Environmental allergies.  - Symptoms are consistent with environmental allergies.  - A prescription for Singulair (montelukast) 10 mg to be taken nightly has been provided.  - She is advised to continue her current antihistamine regimen.  - If there is no improvement after 2 to 3 months, a referral to an allergist will be considered.    2. Palpitations.  - She reports experiencing palpitations when lying on her left side at night.  - Her blood pressure is within normal range and she is not in atrial fibrillation.  - A referral to Dr. Alcides Tijerina, a cardiologist, has been made for further  evaluation of her palpitations.  - An EKG and heart monitor were discussed but deferred to the cardiologist.    3. Health Maintenance.  - Her most recent blood count from 03/2025 was within normal limits.  - A repeat colonoscopy is scheduled for 2028.  - A DEXA scan will be ordered next year.  - Laboratory tests will be conducted today.                    1.   Migraine without aura and without status migrainosus, not intractable        ICD-10-CM: G43.009ICD-9-CM: 346.10        2.   Neutropenia associated with infection        ICD-10-CM: D70.3ICD-9-CM: 288.04        3.   Medicare annual wellness visit, subsequent        ICD-10-CM: Z00.00ICD-9-CM: V70.0        4.   Mixed hyperlipidemia        ICD-10-CM: E78.2ICD-9-CM: 272.2        5.   Hypovitaminosis D        ICD-10-CM: E55.9ICD-9-CM: 268.9        6.   Gastroesophageal reflux disease with esophagitis, unspecified whether hemorrhage        ICD-10-CM: K21.00ICD-9-CM: 530.11        7.   Palpitations        ICD-10-CM: R00.2ICD-9-CM: 785.1        8.   Nonrheumatic aortic valve insufficiency        ICD-10-CM: I35.1ICD-9-CM: 424.1        9.   Allergy to environmental factors        ICD-10-CM: Z91.09ICD-9-CM: V15.09                  Follow Up   Return in about 1 year (around 7/7/2026) for Medicare Wellness.  Patient was given instructions and counseling regarding her condition or for health maintenance advice. Please see specific information pulled into the AVS if appropriate.  Patient or patient representative verbalized consent for the use of Ambient Listening during the visit with  Claudio Bejarano MD for chart documentation. 7/7/2025  10:50 EDT

## 2025-07-07 NOTE — PATIENT INSTRUCTIONS
Medicare Wellness  Personal Prevention Plan of Service     Date of Office Visit:    Encounter Provider:  Claudio Bejarano MD  Place of Service:  [facility]  Patient Name: Pebbles Khan  :  1955    As part of the Medicare Wellness portion of your visit today, we are providing you with this personalized preventive plan of services (PPPS). This plan is based upon recommendations of the [place] Preventive Services Task Force (USPSTF) and the Advisory Committee on Immunization Practices (ACIP).    This lists the preventive care services that should be considered, and provides dates of when you are due. Items listed as completed are up-to-date and do not require any further intervention.    Health Maintenance   Topic Date Due    TDAP/TD VACCINES (1 - Tdap) Never done    Pneumococcal Vaccine 50+ (1 of 1 - PCV) Never done    ZOSTER VACCINE (1 of 2) Never done    HEPATITIS C SCREENING  Never done    DXA SCAN  2021    COVID-19 Vaccine (2024- season) 2024    LIPID PANEL  2025    INFLUENZA VACCINE  2025    ANNUAL WELLNESS VISIT  2026    MAMMOGRAM  2027    COLORECTAL CANCER SCREENING  2028       Orders Placed This Encounter   Procedures    Ambulatory Referral to Cardiology for Valvular Disease, Arrythmia     Referral Priority:   Routine     Referral Type:   Consultation     Referral Reason:   Specialty Services Required     Referral Location:   [facility]     Referred to Provider:   Alcides Tijerina MD     Requested Specialty:   Cardiology     Number of Visits Requested:   1       No follow-ups on file.

## 2025-07-08 LAB
25(OH)D3+25(OH)D2 SERPL-MCNC: 43.5 NG/ML (ref 30–100)
ALBUMIN SERPL-MCNC: 4.7 G/DL (ref 3.5–5.2)
ALBUMIN/GLOB SERPL: 1.7 G/DL
ALP SERPL-CCNC: 74 U/L (ref 39–117)
ALT SERPL-CCNC: 19 U/L (ref 1–33)
APPEARANCE UR: CLEAR
AST SERPL-CCNC: 21 U/L (ref 1–32)
BACTERIA #/AREA URNS HPF: NORMAL /HPF
BASOPHILS # BLD AUTO: 0.03 10*3/MM3 (ref 0–0.2)
BASOPHILS NFR BLD AUTO: 0.5 % (ref 0–1.5)
BILIRUB SERPL-MCNC: 0.4 MG/DL (ref 0–1.2)
BILIRUB UR QL STRIP: NEGATIVE
BUN SERPL-MCNC: 16 MG/DL (ref 8–23)
BUN/CREAT SERPL: 18.4 (ref 7–25)
CALCIUM SERPL-MCNC: 9.7 MG/DL (ref 8.6–10.5)
CASTS URNS MICRO: NORMAL
CHLORIDE SERPL-SCNC: 103 MMOL/L (ref 98–107)
CHOLEST SERPL-MCNC: 164 MG/DL (ref 0–200)
CHOLEST/HDLC SERPL: 2.83 {RATIO}
CO2 SERPL-SCNC: 26.6 MMOL/L (ref 22–29)
COLOR UR: YELLOW
CREAT SERPL-MCNC: 0.87 MG/DL (ref 0.57–1)
EGFRCR SERPLBLD CKD-EPI 2021: 71.3 ML/MIN/1.73
EOSINOPHIL # BLD AUTO: 0.06 10*3/MM3 (ref 0–0.4)
EOSINOPHIL NFR BLD AUTO: 0.9 % (ref 0.3–6.2)
EPI CELLS #/AREA URNS HPF: NORMAL /HPF
ERYTHROCYTE [DISTWIDTH] IN BLOOD BY AUTOMATED COUNT: 12.8 % (ref 12.3–15.4)
GLOBULIN SER CALC-MCNC: 2.7 GM/DL
GLUCOSE SERPL-MCNC: 94 MG/DL (ref 65–99)
GLUCOSE UR QL STRIP: NEGATIVE
HBA1C MFR BLD: 5.5 % (ref 4.8–5.6)
HCT VFR BLD AUTO: 43.2 % (ref 34–46.6)
HDLC SERPL-MCNC: 58 MG/DL (ref 40–60)
HGB BLD-MCNC: 14 G/DL (ref 12–15.9)
HGB UR QL STRIP: NEGATIVE
IMM GRANULOCYTES # BLD AUTO: 0.02 10*3/MM3 (ref 0–0.05)
IMM GRANULOCYTES NFR BLD AUTO: 0.3 % (ref 0–0.5)
KETONES UR QL STRIP: NEGATIVE
LDLC SERPL CALC-MCNC: 83 MG/DL (ref 0–100)
LEUKOCYTE ESTERASE UR QL STRIP: ABNORMAL
LYMPHOCYTES # BLD AUTO: 1.08 10*3/MM3 (ref 0.7–3.1)
LYMPHOCYTES NFR BLD AUTO: 16.4 % (ref 19.6–45.3)
MCH RBC QN AUTO: 29.1 PG (ref 26.6–33)
MCHC RBC AUTO-ENTMCNC: 32.4 G/DL (ref 31.5–35.7)
MCV RBC AUTO: 89.8 FL (ref 79–97)
MONOCYTES # BLD AUTO: 0.46 10*3/MM3 (ref 0.1–0.9)
MONOCYTES NFR BLD AUTO: 7 % (ref 5–12)
NEUTROPHILS # BLD AUTO: 4.94 10*3/MM3 (ref 1.7–7)
NEUTROPHILS NFR BLD AUTO: 74.9 % (ref 42.7–76)
NITRITE UR QL STRIP: NEGATIVE
NRBC BLD AUTO-RTO: 0 /100 WBC (ref 0–0.2)
PH UR STRIP: 6 [PH] (ref 5–8)
PLATELET # BLD AUTO: 290 10*3/MM3 (ref 140–450)
POTASSIUM SERPL-SCNC: 4.6 MMOL/L (ref 3.5–5.2)
PROT SERPL-MCNC: 7.4 G/DL (ref 6–8.5)
PROT UR QL STRIP: NEGATIVE
RBC # BLD AUTO: 4.81 10*6/MM3 (ref 3.77–5.28)
RBC #/AREA URNS HPF: NORMAL /HPF
SODIUM SERPL-SCNC: 141 MMOL/L (ref 136–145)
SP GR UR STRIP: 1.01 (ref 1–1.03)
T3FREE SERPL-MCNC: 3.2 PG/ML (ref 2–4.4)
T4 FREE SERPL-MCNC: 1.18 NG/DL (ref 0.92–1.68)
TRIGL SERPL-MCNC: 132 MG/DL (ref 0–150)
TSH SERPL DL<=0.005 MIU/L-ACNC: 0.93 UIU/ML (ref 0.27–4.2)
UROBILINOGEN UR STRIP-MCNC: ABNORMAL MG/DL
VIT B12 SERPL-MCNC: 1148 PG/ML (ref 211–946)
VLDLC SERPL CALC-MCNC: 23 MG/DL (ref 5–40)
WBC # BLD AUTO: 6.59 10*3/MM3 (ref 3.4–10.8)
WBC #/AREA URNS HPF: NORMAL /HPF

## 2025-08-14 ENCOUNTER — OFFICE VISIT (OUTPATIENT)
Dept: CARDIOLOGY | Age: 70
End: 2025-08-14
Payer: MEDICARE

## 2025-08-14 VITALS
SYSTOLIC BLOOD PRESSURE: 128 MMHG | DIASTOLIC BLOOD PRESSURE: 76 MMHG | BODY MASS INDEX: 26.13 KG/M2 | WEIGHT: 142 LBS | HEIGHT: 62 IN | HEART RATE: 80 BPM

## 2025-08-14 DIAGNOSIS — I35.1 NONRHEUMATIC AORTIC VALVE INSUFFICIENCY: ICD-10-CM

## 2025-08-14 DIAGNOSIS — I49.1 PAC (PREMATURE ATRIAL CONTRACTION): Primary | ICD-10-CM

## 2025-08-14 DIAGNOSIS — I34.0 NONRHEUMATIC MITRAL VALVE REGURGITATION: ICD-10-CM

## 2025-08-14 PROBLEM — J18.9 PNEUMONIA: Status: RESOLVED | Noted: 2025-02-09 | Resolved: 2025-08-14

## 2025-08-14 PROBLEM — D70.3 NEUTROPENIA ASSOCIATED WITH INFECTION: Status: RESOLVED | Noted: 2025-03-25 | Resolved: 2025-08-14

## 2025-08-14 PROBLEM — R03.0 ELEVATED BLOOD PRESSURE READING: Status: RESOLVED | Noted: 2022-06-17 | Resolved: 2025-08-14

## 2025-08-14 PROBLEM — J10.00 PNEUMONIA DUE TO INFLUENZA A VIRUS: Status: RESOLVED | Noted: 2025-02-12 | Resolved: 2025-08-14

## 2025-08-14 PROCEDURE — 99204 OFFICE O/P NEW MOD 45 MIN: CPT | Performed by: INTERNAL MEDICINE

## 2025-08-14 PROCEDURE — 93000 ELECTROCARDIOGRAM COMPLETE: CPT | Performed by: INTERNAL MEDICINE

## 2025-08-14 PROCEDURE — 1159F MED LIST DOCD IN RCRD: CPT | Performed by: INTERNAL MEDICINE

## 2025-08-14 PROCEDURE — 1160F RVW MEDS BY RX/DR IN RCRD: CPT | Performed by: INTERNAL MEDICINE

## 2025-08-25 ENCOUNTER — TELEPHONE (OUTPATIENT)
Dept: CARDIOLOGY | Age: 70
End: 2025-08-25
Payer: MEDICARE

## 2025-08-26 ENCOUNTER — HOSPITAL ENCOUNTER (OUTPATIENT)
Dept: CARDIOLOGY | Facility: HOSPITAL | Age: 70
Discharge: HOME | End: 2025-08-26
Admitting: INTERNAL MEDICINE
Payer: MEDICARE

## 2025-08-26 VITALS
HEIGHT: 62 IN | SYSTOLIC BLOOD PRESSURE: 166 MMHG | OXYGEN SATURATION: 97 % | WEIGHT: 142 LBS | DIASTOLIC BLOOD PRESSURE: 74 MMHG | HEART RATE: 67 BPM | BODY MASS INDEX: 26.13 KG/M2

## 2025-08-26 DIAGNOSIS — I49.1 PAC (PREMATURE ATRIAL CONTRACTION): ICD-10-CM

## 2025-08-26 DIAGNOSIS — I34.0 NONRHEUMATIC MITRAL VALVE REGURGITATION: ICD-10-CM

## 2025-08-26 DIAGNOSIS — I35.1 NONRHEUMATIC AORTIC VALVE INSUFFICIENCY: ICD-10-CM

## 2025-08-26 LAB
AORTIC ARCH: 3 CM
AORTIC DIMENSIONLESS INDEX: 0.65 (DI)
ASCENDING AORTA: 3.2 CM
AV MEAN PRESS GRAD SYS DOP V1V2: 8 MMHG
AV VMAX SYS DOP: 192 CM/SEC
BH CV ECHO LEFT ATRIAL STRAIN: 16.1 %
BH CV ECHO MEAS - ACS: 2.3 CM
BH CV ECHO MEAS - AI P1/2T: 640.1 MSEC
BH CV ECHO MEAS - AO MAX PG: 14.7 MMHG
BH CV ECHO MEAS - AO ROOT AREA (BSA CORRECTED): 1.8 CM2
BH CV ECHO MEAS - AO ROOT DIAM: 3 CM
BH CV ECHO MEAS - AO V2 VTI: 42 CM
BH CV ECHO MEAS - AVA(I,D): 1.89 CM2
BH CV ECHO MEAS - EDV(CUBED): 74.1 ML
BH CV ECHO MEAS - EDV(MOD-SP2): 70 ML
BH CV ECHO MEAS - EDV(MOD-SP4): 85 ML
BH CV ECHO MEAS - EF(MOD-SP2): 60 %
BH CV ECHO MEAS - EF(MOD-SP4): 62.4 %
BH CV ECHO MEAS - ESV(CUBED): 28.7 ML
BH CV ECHO MEAS - ESV(MOD-SP2): 28 ML
BH CV ECHO MEAS - ESV(MOD-SP4): 32 ML
BH CV ECHO MEAS - FS: 27.1 %
BH CV ECHO MEAS - IVS/LVPW: 0.7 CM
BH CV ECHO MEAS - IVSD: 0.7 CM
BH CV ECHO MEAS - LAT PEAK E' VEL: 5.2 CM/SEC
BH CV ECHO MEAS - LV DIASTOLIC VOL/BSA (35-75): 51.4 CM2
BH CV ECHO MEAS - LV MASS(C)D: 109.8 GRAMS
BH CV ECHO MEAS - LV MAX PG: 7.5 MMHG
BH CV ECHO MEAS - LV MEAN PG: 4.1 MMHG
BH CV ECHO MEAS - LV SYSTOLIC VOL/BSA (12-30): 19.4 CM2
BH CV ECHO MEAS - LV V1 MAX: 136.9 CM/SEC
BH CV ECHO MEAS - LV V1 VTI: 27.3 CM
BH CV ECHO MEAS - LVIDD: 4.2 CM
BH CV ECHO MEAS - LVIDS: 3.1 CM
BH CV ECHO MEAS - LVOT AREA: 2.9 CM2
BH CV ECHO MEAS - LVOT DIAM: 1.92 CM
BH CV ECHO MEAS - LVPWD: 1 CM
BH CV ECHO MEAS - MED PEAK E' VEL: 5.3 CM/SEC
BH CV ECHO MEAS - MR MAX PG: 66.3 MMHG
BH CV ECHO MEAS - MR MAX VEL: 407.2 CM/SEC
BH CV ECHO MEAS - MV A DUR: 0.1 SEC
BH CV ECHO MEAS - MV A MAX VEL: 106.3 CM/SEC
BH CV ECHO MEAS - MV DEC SLOPE: 423 CM/SEC2
BH CV ECHO MEAS - MV DEC TIME: 0.23 SEC
BH CV ECHO MEAS - MV E MAX VEL: 91.7 CM/SEC
BH CV ECHO MEAS - MV E/A: 0.86
BH CV ECHO MEAS - MV MAX PG: 4.9 MMHG
BH CV ECHO MEAS - MV MEAN PG: 2.5 MMHG
BH CV ECHO MEAS - MV P1/2T: 68.5 MSEC
BH CV ECHO MEAS - MV V2 VTI: 39.8 CM
BH CV ECHO MEAS - MVA(P1/2T): 3.2 CM2
BH CV ECHO MEAS - MVA(VTI): 1.99 CM2
BH CV ECHO MEAS - PA ACC TIME: 0.1 SEC
BH CV ECHO MEAS - PA V2 MAX: 93.5 CM/SEC
BH CV ECHO MEAS - PI END-D VEL: 138.8 CM/SEC
BH CV ECHO MEAS - PULM A REVS DUR: 0.09 SEC
BH CV ECHO MEAS - PULM A REVS VEL: 33.6 CM/SEC
BH CV ECHO MEAS - PULM DIAS VEL: 48.3 CM/SEC
BH CV ECHO MEAS - PULM S/D: 1.09
BH CV ECHO MEAS - PULM SYS VEL: 52.5 CM/SEC
BH CV ECHO MEAS - QP/QS: 0.58
BH CV ECHO MEAS - RAP SYSTOLE: 3 MMHG
BH CV ECHO MEAS - RV MAX PG: 3.1 MMHG
BH CV ECHO MEAS - RV V1 MAX: 88.1 CM/SEC
BH CV ECHO MEAS - RV V1 VTI: 17.1 CM
BH CV ECHO MEAS - RVOT DIAM: 1.85 CM
BH CV ECHO MEAS - RVSP: 21.9 MMHG
BH CV ECHO MEAS - SUP REN AO DIAM: 2 CM
BH CV ECHO MEAS - SV(LVOT): 79.3 ML
BH CV ECHO MEAS - SV(MOD-SP2): 42 ML
BH CV ECHO MEAS - SV(MOD-SP4): 53 ML
BH CV ECHO MEAS - SV(RVOT): 45.9 ML
BH CV ECHO MEAS - SVI(LVOT): 48 ML/M2
BH CV ECHO MEAS - SVI(MOD-SP2): 25.4 ML/M2
BH CV ECHO MEAS - SVI(MOD-SP4): 32.1 ML/M2
BH CV ECHO MEAS - TAPSE (>1.6): 2 CM
BH CV ECHO MEAS - TR MAX PG: 18.9 MMHG
BH CV ECHO MEAS - TR MAX VEL: 217.2 CM/SEC
BH CV ECHO MEAS RV FREE WALL STRAIN: -13.5 %
BH CV ECHO MEASUREMENTS AVERAGE E/E' RATIO: 17.47
BH CV XLRA - RV BASE: 3.1 CM
BH CV XLRA - RV LENGTH: 7.5 CM
BH CV XLRA - RV MID: 2.44 CM
BH CV XLRA - TDI S': 11.6 CM/SEC
LEFT ATRIUM VOLUME INDEX: 42 ML/M2
LV EF BIPLANE MOD: 63 %
SINUS: 3.2 CM
STJ: 3 CM

## 2025-08-26 PROCEDURE — 93356 MYOCRD STRAIN IMG SPCKL TRCK: CPT

## 2025-08-26 PROCEDURE — 25510000001 PERFLUTREN 6.52 MG/ML SUSPENSION 2 ML VIAL: Performed by: INTERNAL MEDICINE

## 2025-08-26 PROCEDURE — 93306 TTE W/DOPPLER COMPLETE: CPT

## 2025-08-26 RX ADMIN — PERFLUTREN 2 ML: 6.52 INJECTION, SUSPENSION INTRAVENOUS at 11:17

## (undated) DEVICE — CANN NASL CO2 TRULINK W/O2 A/

## (undated) DEVICE — GOWN ,SIRUS,NONREINFORCED 3XL: Brand: MEDLINE

## (undated) DEVICE — KT ORCA ORCAPOD DISP STRL

## (undated) DEVICE — FLEX ADVANTAGE 1500CC: Brand: FLEX ADVANTAGE

## (undated) DEVICE — GOWN ISOL W/THUMB UNIV BLU BX/15

## (undated) DEVICE — Device